# Patient Record
Sex: MALE | Race: WHITE | HISPANIC OR LATINO | URBAN - METROPOLITAN AREA
[De-identification: names, ages, dates, MRNs, and addresses within clinical notes are randomized per-mention and may not be internally consistent; named-entity substitution may affect disease eponyms.]

---

## 2022-01-19 ENCOUNTER — INPATIENT (INPATIENT)
Facility: HOSPITAL | Age: 32
LOS: 1 days | Discharge: HOME | End: 2022-01-21
Attending: INTERNAL MEDICINE | Admitting: INTERNAL MEDICINE
Payer: COMMERCIAL

## 2022-01-19 VITALS
OXYGEN SATURATION: 100 % | DIASTOLIC BLOOD PRESSURE: 87 MMHG | SYSTOLIC BLOOD PRESSURE: 129 MMHG | RESPIRATION RATE: 16 BRPM | HEART RATE: 108 BPM

## 2022-01-19 DIAGNOSIS — E13.10 OTHER SPECIFIED DIABETES MELLITUS WITH KETOACIDOSIS WITHOUT COMA: ICD-10-CM

## 2022-01-19 DIAGNOSIS — E11.9 TYPE 2 DIABETES MELLITUS WITHOUT COMPLICATIONS: ICD-10-CM

## 2022-01-19 DIAGNOSIS — Z29.9 ENCOUNTER FOR PROPHYLACTIC MEASURES, UNSPECIFIED: ICD-10-CM

## 2022-01-19 DIAGNOSIS — R73.9 HYPERGLYCEMIA, UNSPECIFIED: ICD-10-CM

## 2022-01-19 DIAGNOSIS — R74.8 ABNORMAL LEVELS OF OTHER SERUM ENZYMES: ICD-10-CM

## 2022-01-19 LAB
A1C WITH ESTIMATED AVERAGE GLUCOSE RESULT: 10.8 % — HIGH (ref 4–5.6)
A1C WITH ESTIMATED AVERAGE GLUCOSE RESULT: SIGNIFICANT CHANGE UP % (ref 4–5.6)
ALBUMIN SERPL ELPH-MCNC: 4.2 G/DL — SIGNIFICANT CHANGE UP (ref 3.3–5)
ALBUMIN SERPL ELPH-MCNC: 4.4 G/DL — SIGNIFICANT CHANGE UP (ref 3.3–5)
ALP SERPL-CCNC: 220 U/L — HIGH (ref 40–120)
ALP SERPL-CCNC: 237 U/L — HIGH (ref 40–120)
ALT FLD-CCNC: 26 U/L — SIGNIFICANT CHANGE UP (ref 4–41)
ALT FLD-CCNC: 30 U/L — SIGNIFICANT CHANGE UP (ref 4–41)
ANION GAP SERPL CALC-SCNC: 14 MMOL/L — SIGNIFICANT CHANGE UP (ref 7–14)
ANION GAP SERPL CALC-SCNC: 17 MMOL/L — HIGH (ref 7–14)
ANION GAP SERPL CALC-SCNC: 18 MMOL/L — HIGH (ref 7–14)
APPEARANCE UR: ABNORMAL
AST SERPL-CCNC: 19 U/L — SIGNIFICANT CHANGE UP (ref 4–40)
AST SERPL-CCNC: 39 U/L — SIGNIFICANT CHANGE UP (ref 4–40)
B-OH-BUTYR SERPL-SCNC: 1.8 MMOL/L — HIGH (ref 0–0.4)
BASOPHILS # BLD AUTO: 0.04 K/UL — SIGNIFICANT CHANGE UP (ref 0–0.2)
BASOPHILS NFR BLD AUTO: 0.5 % — SIGNIFICANT CHANGE UP (ref 0–2)
BILIRUB SERPL-MCNC: 0.4 MG/DL — SIGNIFICANT CHANGE UP (ref 0.2–1.2)
BILIRUB SERPL-MCNC: 0.5 MG/DL — SIGNIFICANT CHANGE UP (ref 0.2–1.2)
BILIRUB UR-MCNC: NEGATIVE — SIGNIFICANT CHANGE UP
BLOOD GAS VENOUS COMPREHENSIVE RESULT: SIGNIFICANT CHANGE UP
BUN SERPL-MCNC: 10 MG/DL — SIGNIFICANT CHANGE UP (ref 7–23)
BUN SERPL-MCNC: 10 MG/DL — SIGNIFICANT CHANGE UP (ref 7–23)
BUN SERPL-MCNC: 9 MG/DL — SIGNIFICANT CHANGE UP (ref 7–23)
CALCIUM SERPL-MCNC: 9.2 MG/DL — SIGNIFICANT CHANGE UP (ref 8.4–10.5)
CALCIUM SERPL-MCNC: 9.4 MG/DL — SIGNIFICANT CHANGE UP (ref 8.4–10.5)
CALCIUM SERPL-MCNC: 9.4 MG/DL — SIGNIFICANT CHANGE UP (ref 8.4–10.5)
CHLORIDE SERPL-SCNC: 100 MMOL/L — SIGNIFICANT CHANGE UP (ref 98–107)
CHLORIDE SERPL-SCNC: 102 MMOL/L — SIGNIFICANT CHANGE UP (ref 98–107)
CHLORIDE SERPL-SCNC: 97 MMOL/L — LOW (ref 98–107)
CO2 SERPL-SCNC: 19 MMOL/L — LOW (ref 22–31)
CO2 SERPL-SCNC: 22 MMOL/L — SIGNIFICANT CHANGE UP (ref 22–31)
CO2 SERPL-SCNC: 24 MMOL/L — SIGNIFICANT CHANGE UP (ref 22–31)
COLOR SPEC: SIGNIFICANT CHANGE UP
CREAT SERPL-MCNC: 0.69 MG/DL — SIGNIFICANT CHANGE UP (ref 0.5–1.3)
CREAT SERPL-MCNC: 0.79 MG/DL — SIGNIFICANT CHANGE UP (ref 0.5–1.3)
CREAT SERPL-MCNC: 0.87 MG/DL — SIGNIFICANT CHANGE UP (ref 0.5–1.3)
DIFF PNL FLD: NEGATIVE — SIGNIFICANT CHANGE UP
EOSINOPHIL # BLD AUTO: 0.03 K/UL — SIGNIFICANT CHANGE UP (ref 0–0.5)
EOSINOPHIL NFR BLD AUTO: 0.3 % — SIGNIFICANT CHANGE UP (ref 0–6)
ESTIMATED AVERAGE GLUCOSE: 263 — SIGNIFICANT CHANGE UP
FLUAV AG NPH QL: SIGNIFICANT CHANGE UP
FLUBV AG NPH QL: SIGNIFICANT CHANGE UP
GLUCOSE BLDC GLUCOMTR-MCNC: 226 MG/DL — HIGH (ref 70–99)
GLUCOSE BLDC GLUCOMTR-MCNC: 321 MG/DL — HIGH (ref 70–99)
GLUCOSE SERPL-MCNC: 311 MG/DL — HIGH (ref 70–99)
GLUCOSE SERPL-MCNC: 382 MG/DL — HIGH (ref 70–99)
GLUCOSE SERPL-MCNC: 450 MG/DL — CRITICAL HIGH (ref 70–99)
GLUCOSE UR QL: ABNORMAL
GRAN CASTS # UR COMP ASSIST: 2 /LPF — HIGH
HCT VFR BLD CALC: 44.5 % — SIGNIFICANT CHANGE UP (ref 39–50)
HGB BLD-MCNC: 14.8 G/DL — SIGNIFICANT CHANGE UP (ref 13–17)
HYALINE CASTS # UR AUTO: 1 /LPF — SIGNIFICANT CHANGE UP (ref 0–7)
IANC: 5.81 K/UL — SIGNIFICANT CHANGE UP (ref 1.5–8.5)
IMM GRANULOCYTES NFR BLD AUTO: 0.2 % — SIGNIFICANT CHANGE UP (ref 0–1.5)
KETONES UR-MCNC: ABNORMAL
LEUKOCYTE ESTERASE UR-ACNC: NEGATIVE — SIGNIFICANT CHANGE UP
LYMPHOCYTES # BLD AUTO: 1.98 K/UL — SIGNIFICANT CHANGE UP (ref 1–3.3)
LYMPHOCYTES # BLD AUTO: 22.8 % — SIGNIFICANT CHANGE UP (ref 13–44)
MCHC RBC-ENTMCNC: 28.7 PG — SIGNIFICANT CHANGE UP (ref 27–34)
MCHC RBC-ENTMCNC: 33.3 GM/DL — SIGNIFICANT CHANGE UP (ref 32–36)
MCV RBC AUTO: 86.4 FL — SIGNIFICANT CHANGE UP (ref 80–100)
MONOCYTES # BLD AUTO: 0.82 K/UL — SIGNIFICANT CHANGE UP (ref 0–0.9)
MONOCYTES NFR BLD AUTO: 9.4 % — SIGNIFICANT CHANGE UP (ref 2–14)
NEUTROPHILS # BLD AUTO: 5.81 K/UL — SIGNIFICANT CHANGE UP (ref 1.8–7.4)
NEUTROPHILS NFR BLD AUTO: 66.8 % — SIGNIFICANT CHANGE UP (ref 43–77)
NITRITE UR-MCNC: NEGATIVE — SIGNIFICANT CHANGE UP
NRBC # BLD: 0 /100 WBCS — SIGNIFICANT CHANGE UP
NRBC # FLD: 0 K/UL — SIGNIFICANT CHANGE UP
PH UR: 5.5 — SIGNIFICANT CHANGE UP (ref 5–8)
PLATELET # BLD AUTO: 259 K/UL — SIGNIFICANT CHANGE UP (ref 150–400)
POTASSIUM SERPL-MCNC: 4 MMOL/L — SIGNIFICANT CHANGE UP (ref 3.5–5.3)
POTASSIUM SERPL-MCNC: 4.6 MMOL/L — SIGNIFICANT CHANGE UP (ref 3.5–5.3)
POTASSIUM SERPL-MCNC: 5.1 MMOL/L — SIGNIFICANT CHANGE UP (ref 3.5–5.3)
POTASSIUM SERPL-SCNC: 4 MMOL/L — SIGNIFICANT CHANGE UP (ref 3.5–5.3)
POTASSIUM SERPL-SCNC: 4.6 MMOL/L — SIGNIFICANT CHANGE UP (ref 3.5–5.3)
POTASSIUM SERPL-SCNC: 5.1 MMOL/L — SIGNIFICANT CHANGE UP (ref 3.5–5.3)
PROT SERPL-MCNC: 7.1 G/DL — SIGNIFICANT CHANGE UP (ref 6–8.3)
PROT SERPL-MCNC: 8 G/DL — SIGNIFICANT CHANGE UP (ref 6–8.3)
PROT UR-MCNC: ABNORMAL
RBC # BLD: 5.15 M/UL — SIGNIFICANT CHANGE UP (ref 4.2–5.8)
RBC # FLD: 11.9 % — SIGNIFICANT CHANGE UP (ref 10.3–14.5)
RSV RNA NPH QL NAA+NON-PROBE: SIGNIFICANT CHANGE UP
SARS-COV-2 RNA SPEC QL NAA+PROBE: SIGNIFICANT CHANGE UP
SODIUM SERPL-SCNC: 134 MMOL/L — LOW (ref 135–145)
SODIUM SERPL-SCNC: 139 MMOL/L — SIGNIFICANT CHANGE UP (ref 135–145)
SODIUM SERPL-SCNC: 140 MMOL/L — SIGNIFICANT CHANGE UP (ref 135–145)
SP GR SPEC: 1.05 — SIGNIFICANT CHANGE UP (ref 1–1.05)
UROBILINOGEN FLD QL: SIGNIFICANT CHANGE UP
WBC # BLD: 8.7 K/UL — SIGNIFICANT CHANGE UP (ref 3.8–10.5)
WBC # FLD AUTO: 8.7 K/UL — SIGNIFICANT CHANGE UP (ref 3.8–10.5)
WBC UR QL: 1 /HPF — SIGNIFICANT CHANGE UP (ref 0–5)

## 2022-01-19 PROCEDURE — 99223 1ST HOSP IP/OBS HIGH 75: CPT | Mod: GC

## 2022-01-19 PROCEDURE — 99285 EMERGENCY DEPT VISIT HI MDM: CPT

## 2022-01-19 RX ORDER — DEXTROSE 50 % IN WATER 50 %
25 SYRINGE (ML) INTRAVENOUS ONCE
Refills: 0 | Status: DISCONTINUED | OUTPATIENT
Start: 2022-01-19 | End: 2022-01-20

## 2022-01-19 RX ORDER — LANOLIN ALCOHOL/MO/W.PET/CERES
3 CREAM (GRAM) TOPICAL AT BEDTIME
Refills: 0 | Status: DISCONTINUED | OUTPATIENT
Start: 2022-01-19 | End: 2022-01-21

## 2022-01-19 RX ORDER — INSULIN LISPRO 100/ML
VIAL (ML) SUBCUTANEOUS
Refills: 0 | Status: DISCONTINUED | OUTPATIENT
Start: 2022-01-19 | End: 2022-01-20

## 2022-01-19 RX ORDER — INSULIN GLARGINE 100 [IU]/ML
13 INJECTION, SOLUTION SUBCUTANEOUS AT BEDTIME
Refills: 0 | Status: DISCONTINUED | OUTPATIENT
Start: 2022-01-19 | End: 2022-01-20

## 2022-01-19 RX ORDER — SODIUM CHLORIDE 9 MG/ML
2000 INJECTION, SOLUTION INTRAVENOUS ONCE
Refills: 0 | Status: COMPLETED | OUTPATIENT
Start: 2022-01-19 | End: 2022-01-19

## 2022-01-19 RX ORDER — ONDANSETRON 8 MG/1
4 TABLET, FILM COATED ORAL EVERY 8 HOURS
Refills: 0 | Status: DISCONTINUED | OUTPATIENT
Start: 2022-01-19 | End: 2022-01-21

## 2022-01-19 RX ORDER — SODIUM CHLORIDE 9 MG/ML
1000 INJECTION, SOLUTION INTRAVENOUS
Refills: 0 | Status: DISCONTINUED | OUTPATIENT
Start: 2022-01-19 | End: 2022-01-20

## 2022-01-19 RX ORDER — DEXTROSE 50 % IN WATER 50 %
15 SYRINGE (ML) INTRAVENOUS ONCE
Refills: 0 | Status: DISCONTINUED | OUTPATIENT
Start: 2022-01-19 | End: 2022-01-20

## 2022-01-19 RX ORDER — INSULIN LISPRO 100/ML
7 VIAL (ML) SUBCUTANEOUS ONCE
Refills: 0 | Status: COMPLETED | OUTPATIENT
Start: 2022-01-19 | End: 2022-01-19

## 2022-01-19 RX ORDER — DEXTROSE 50 % IN WATER 50 %
12.5 SYRINGE (ML) INTRAVENOUS ONCE
Refills: 0 | Status: DISCONTINUED | OUTPATIENT
Start: 2022-01-19 | End: 2022-01-20

## 2022-01-19 RX ORDER — GLUCAGON INJECTION, SOLUTION 0.5 MG/.1ML
1 INJECTION, SOLUTION SUBCUTANEOUS ONCE
Refills: 0 | Status: DISCONTINUED | OUTPATIENT
Start: 2022-01-19 | End: 2022-01-20

## 2022-01-19 RX ORDER — ACETAMINOPHEN 500 MG
650 TABLET ORAL EVERY 6 HOURS
Refills: 0 | Status: DISCONTINUED | OUTPATIENT
Start: 2022-01-19 | End: 2022-01-21

## 2022-01-19 RX ADMIN — Medication 7 UNIT(S): at 14:09

## 2022-01-19 RX ADMIN — Medication 2: at 19:02

## 2022-01-19 RX ADMIN — INSULIN GLARGINE 13 UNIT(S): 100 INJECTION, SOLUTION SUBCUTANEOUS at 22:36

## 2022-01-19 RX ADMIN — SODIUM CHLORIDE 2000 MILLILITER(S): 9 INJECTION, SOLUTION INTRAVENOUS at 12:35

## 2022-01-19 NOTE — H&P ADULT - ASSESSMENT
31 M no PMHx presents to the hospital after he was referred to the ED by his PMD for elevated glucose. Found to have FS of 403 and elevated beta-hydroxybuterate without acidosis consistent with diabetic ketosis. Patient s/p 2L LR and 7u lispro with improvement in his sugar. Admitted to medicine for further management

## 2022-01-19 NOTE — ED ADULT NURSE NOTE - OBJECTIVE STATEMENT
Pt. is a 31 y.o male who presents to [facility]. Pt. was sent in by PCP for elevated blood sugar. Pt. denies any pmhx. On arrival FS was 411. Pt. endorsing polydipsia, polyuria and polyphagia. Denies any chest pain, N/V/D. States he becomes short of breath easily, but denies hx of asthma. Respirations equal and unlabored, no signs of distress. Normal sinus on cardiac monitor. Comfort measures provided, safety measures implemented, call bell in reach. #20g IV to lt. AC. Labs sent.

## 2022-01-19 NOTE — H&P ADULT - NSHPREVIEWOFSYSTEMS_GEN_ALL_CORE
CONSTITUTIONAL: No fever, no chills, no weight loss  EYES: No eye pain, no visual disturbance  RESPIRATORY: No cough, No SOB  CARDIOVASCULAR: No CP, no palpitations  GASTROINTESTINAL: no abdominal pain, no n/v/d  GENITOURINARY: No dysuria, no hematuria  HEME: No easy bruising, no bleeding gums  NEURO: No headaches, no weakness  SKIN: No itching, no rashes  MSK: No joint pain, no joint swelling CONSTITUTIONAL: No fever, no chills, + weight loss  EYES: No eye pain, +visual disturbance  RESPIRATORY: No cough, No SOB  CARDIOVASCULAR: No CP, no palpitations  GASTROINTESTINAL: no abdominal pain, no n/v/d  GENITOURINARY: No dysuria, no hematuria, +increase in urination  HEME: No easy bruising, no bleeding gums  NEURO: No headaches, + weakness  SKIN: No itching, no rashes  MSK: No joint pain, no joint swelling Review of Systems:   CONSTITUTIONAL: No fever, no chills, + weight loss  EYES: No eye pain, +visual disturbance  ENMT:  No difficulty hearing, tinnitus, vertigo; No sinus or throat pain  NECK: No pain or stiffness  BREASTS: No pain, masses, or nipple discharge  RESPIRATORY: No cough, wheezing, chills or hemoptysis; No shortness of breath  CARDIOVASCULAR: No chest pain, palpitations, dizziness, or leg swelling  GASTROINTESTINAL: No abdominal or epigastric pain. No nausea, vomiting, or hematemesis; No diarrhea or constipation. No melena or hematochezia.  GENITOURINARY: No dysuria, frequency, hematuria, or incontinence  NEUROLOGICAL: No headaches, memory loss, loss of strength, numbness, or tremors  SKIN: No itching, burning, rashes, or lesions   LYMPH NODES: No enlarged glands  ENDOCRINE: No heat or cold intolerance; No hair loss  MUSCULOSKELETAL: No joint pain or swelling; No muscle, back, or extremity pain  PSYCHIATRIC: No depression, anxiety, mood swings, or difficulty sleeping  HEME/LYMPH: No easy bruising, or bleeding gums  ALLERGY AND IMMUNOLOGIC: No hives or eczema

## 2022-01-19 NOTE — H&P ADULT - PROBLEM SELECTOR PLAN 2
Patient with A1c 10.8 i/s/o no prior DM2 diagnosis  - Treatment of DM as above  - Endo consult in AM, will likely need close endocrine and PMD follow-up outpatient  - Send microalbumin/Cr ratio  - Will send Lipid profile in AM Patient with A1c 10.8 i/s/o no prior DM2 diagnosis  - Treatment of DM as above  - Endo consult in AM, will need close endocrine and PMD follow-up outpatient  - Send microalbumin/Cr ratio  - Will send Lipid profile in AM Patient with A1c 10.8 i/s/o no prior DM2 diagnosis  - Treatment of DM as above  - Endo consult in AM, will need close endocrine and PMD follow-up outpatient  - Send microalbumin/Cr ratio  - f/u Lipid profile in AM  - f/u Islet cell antibody, glutamic acid, and zinc transporter

## 2022-01-19 NOTE — ED ADULT NURSE NOTE - CHPI ED NUR SYMPTOMS NEG
no back pain/no chills/no decreased eating/drinking/no dizziness/no fever/no headache/no nausea/no pain/no vomiting

## 2022-01-19 NOTE — H&P ADULT - NSHPSOCIALHISTORY_GEN_ALL_CORE
Living:  Tobacco:  Alcohol:  Drugs: Living: Lives with his family. Works as [employer]  as above  Tobacco: Denies  Alcohol: Has a history of extensive alcohol use in the past. Now reports drinking 17oz of liquor which he chases with beer once a week  Drugs: denies

## 2022-01-19 NOTE — H&P ADULT - PROBLEM SELECTOR PLAN 1
Patient with FG in 400s and elevated beta-hydroxybuterate to 1.8 without acidosis  - s/p 7u of insulin and 2L LR with improving glucose  - Will start patient on weight-based basal and ISS  - C/w Fluids Patient with FG in 400s and elevated beta-hydroxybuterate to 1.8 without acidosis  - s/p 7u of insulin and 2L LR with improving glucose  - Will start patient on weight-based basal and ISS  - C/w 75 cc LR for now Patient with FG in 400s and elevated beta-hydroxybuterate to 1.8 without acidosis. Unclear etiology of exacerbation. Patient without any signs of infection. UA negative  - s/p 7u of insulin and 2L LR with improving glucose  - Will give basal insulin 14u right now with pre-meal 4u  - C/w 75 cc LR for now  - Endo consult in the AM Patient with FG in 400s and elevated beta-hydroxybuterate to 1.8 without acidosis. Unclear etiology of exacerbation. Patient without any signs of infection. UA negative  - s/p 7u of insulin and 2L LR with improving glucose  - Will give basal insulin 13u with pre-meal 4u  - C/w 75 cc LR for now  - Endo consult in the AM

## 2022-01-19 NOTE — ED PROVIDER NOTE - CLINICAL SUMMARY MEDICAL DECISION MAKING FREE TEXT BOX
31M presents with hyperglycemia. R/o DKA. Check HBA1c. May require CDU/admission based on labs/need for insulin.

## 2022-01-19 NOTE — H&P ADULT - PROBLEM SELECTOR PLAN 3
Patient with elevated alk phos to 237, unclear etiology. Denies any abdominal pain. Some reported elevation in alkphos in patient with diabetes. Differential diagnosis to consider hematochromatosis.  - Will send serum iron, ferritin, TIBC, transferrin in AM  - CTM at this time Patient with elevated alk phos to 237, unclear etiology. Denies any abdominal pain. Some reported elevation in alkphos in patient with diabetes. Differential diagnosis to consider hematochromatosis.  - Will send gtt in the AM  - CTM at this time Patient with elevated alk phos to 237, unclear etiology. Denies any abdominal pain. Some reported elevation in alkphos in patient with diabetes. Differential diagnosis to consider hematochromatosis.  - F/u gtt in the AM  - CTM at this time

## 2022-01-19 NOTE — H&P ADULT - ATTENDING COMMENTS
31 M no PMHx presents to the hospital in mild DKA. No clear evidence of infection or MI. Patient with undiagnosed diabetes mellitus, unclear type at this time. Starting glargine. A1c >10%; will c/s endocrine to assist w/management.     I examined this patient and discussed their management with house staff on Jan 19, 2022.

## 2022-01-19 NOTE — ED PROVIDER NOTE - ATTENDING CONTRIBUTION TO CARE
31M w no reported medical history p/w hyperglycemia on outpatient labs. States he has had frequent urination, thirst, and fatigue x 3 weeks. Denies n/v, abdominal pain, chest pain, sob.     Except as documented in the HPI,  all other systems are negative    CONSTITUTIONAL: NAD, awake, alert  HEAD: Normocephalic; atraumatic  ENMT: External appears normal, MMM  NECK: no tenderness, FROM  CARD: Normal Sl, S2; no audible murmurs,rubs  RESP: normal wob, lungs ctab  ABD: soft, non-distended; non-tender  MSK: no edema, normal ROM in all four extremities  SKIN: Warm, dry, no rashes  NEURO: aaox3, moving all extremities spontaneously    31M w no reported medical history p/w hyperglycemia, r/o dka, reassess, abdomen soft nontender, neuro intact, no n/v, no infectious symptoms

## 2022-01-19 NOTE — H&P ADULT - NSHPPHYSICALEXAM_GEN_ALL_CORE
.  VITAL SIGNS:  T(C): 37.1 (01-19-22 @ 12:35), Max: 37.1 (01-19-22 @ 12:35)  T(F): 98.8 (01-19-22 @ 12:35), Max: 98.8 (01-19-22 @ 12:35)  HR: 89 (01-19-22 @ 12:35) (89 - 108)  BP: 135/78 (01-19-22 @ 12:35) (129/87 - 135/78)  BP(mean): --  RR: 18 (01-19-22 @ 12:35) (16 - 18)  SpO2: 100% (01-19-22 @ 12:35) (100% - 100%)    PHYSICAL EXAM:    Constitutional: WDWN resting comfortably in bed; NAD  Head: NC/AT  Eyes: PERRL, EOMI, anicteric sclera  ENT: dry mucous membranes  Neck: supple; no JVD  Respiratory: CTA B/L; no W/R/R, no retractions  Cardiac: +S1/S2; RRR; no M/R/G; PMI non-displaced  Gastrointestinal: soft, NT/ND; no rebound or guarding; +BSx4  Back: spine midline, no bony tenderness or step-offs; no CVAT B/L  Extremities: WWP, no clubbing or cyanosis; no peripheral edema. Capillary refill <2 sec  Musculoskeletal: NROM x4; no joint swelling, tenderness or erythema  Vascular: 2+ radial, femoral, DP/PT pulses B/L  Dermatologic: skin warm, dry and intact; no rashes, wounds, or scars  Lymphatic: no submandibular or cervical LAD  Neurologic: AAOx3; CNII-XII grossly intact; no focal deficits  Psychiatric: affect and characteristics of appearance, verbalizations, behaviors are appropriate VITAL SIGNS:  T(C): 37.1 (01-19-22 @ 12:35), Max: 37.1 (01-19-22 @ 12:35)  T(F): 98.8 (01-19-22 @ 12:35), Max: 98.8 (01-19-22 @ 12:35)  HR: 89 (01-19-22 @ 12:35) (89 - 108)  BP: 135/78 (01-19-22 @ 12:35) (129/87 - 135/78)  BP(mean): --  RR: 18 (01-19-22 @ 12:35) (16 - 18)  SpO2: 100% (01-19-22 @ 12:35) (100% - 100%)    PHYSICAL EXAM:    Constitutional: WDWN resting comfortably in bed; NAD  Head: NC/AT  Eyes: PERRL, EOMI, anicteric sclera  ENT: MMM  Neck: supple; no JVD  Respiratory: CTA B/L; no W/R/R, no retractions  Cardiac: +S1/S2; RRR; no M/R/G; PMI non-displaced  Gastrointestinal: soft, NT/ND; no rebound or guarding; +BSx4  Back: spine midline, no bony tenderness or step-offs; no CVAT B/L  Extremities: WWP, no clubbing or cyanosis; no peripheral edema. Capillary refill <2 sec  Musculoskeletal: NROM x4; no joint swelling, tenderness or erythema  Vascular: 2+ radial, femoral, DP/PT pulses B/L  Dermatologic: skin warm, dry and intact; no rashes, wounds, or scars  Lymphatic: no submandibular or cervical LAD  Neurologic: AAOx3; CNII-XII grossly intact; no focal deficits  Psychiatric: affect and characteristics of appearance, verbalizations, behaviors are appropriate

## 2022-01-19 NOTE — ED PROVIDER NOTE - PHYSICAL EXAMINATION
General: Well developed, well nourished  HEENT: Normocephalic and atraumatic, EOMI, Trachea midline.   Cardiac: Normal S1 and S2 w/ RRR. No MRG.  Pulmonary: CTA bilaterally. No increased WOB.   Abdominal: Soft, NTND  Neurologic: No focal sensory or motor deficits.  Musculoskeletal: No limited ROM or deformities  Vascular: Warm and well perfused  Skin: Color appropriate   Psychiatric: Appropriate mood and affect. No apparent risk to self or others.  Bg Bonner M.D.

## 2022-01-19 NOTE — ED ADULT NURSE NOTE - NSIMPLEMENTINTERV_GEN_ALL_ED
Implemented All Universal Safety Interventions:  [place] to call system. Call bell, personal items and telephone within reach. Instruct patient to call for assistance. Room bathroom lighting operational. Non-slip footwear when patient is off stretcher. Physically safe environment: no spills, clutter or unnecessary equipment. Stretcher in lowest position, wheels locked, appropriate side rails in place.

## 2022-01-19 NOTE — ED ADULT TRIAGE NOTE - CHIEF COMPLAINT QUOTE
Pt sent by PCP with high BP , high , increased thirst, increase urination., weakness.    Denies chest pain, sob, dizziness, cough, chills.  Pt not dx with DM

## 2022-01-19 NOTE — H&P ADULT - NSHPLABSRESULTS_GEN_ALL_CORE
.  LABS:                         14.8   8.70  )-----------( 259      ( 2022 14:36 )             44.5         140  |  102  |  9   ----------------------------<  311<H>  4.0   |  24  |  0.79    Ca    9.2      2022 16:24    TPro  7.1  /  Alb  4.2  /  TBili  0.4  /  DBili  x   /  AST  19  /  ALT  26  /  AlkPhos  220<H>        Urinalysis Basic - ( 2022 13:06 )    Color: Light Yellow / Appearance: Slightly Turbid / S.046 / pH: x  Gluc: x / Ketone: Moderate  / Bili: Negative / Urobili: <2 mg/dL   Blood: x / Protein: Trace / Nitrite: Negative   Leuk Esterase: Negative / RBC: x / WBC 1 /HPF   Sq Epi: x / Non Sq Epi: x / Bacteria: x                RADIOLOGY, EKG & ADDITIONAL TESTS: Reviewed.

## 2022-01-19 NOTE — H&P ADULT - HISTORY OF PRESENT ILLNESS
*INCOMPLETE*    31 M no PMHx presents to the hospital after he was found to have elevated FS to 600 with his outpatient PMD. Patient reports that for the past 3 weeks he has been having fatigue, frequent urination, and thirst. He denies any HA, blurry vision, recent fevers, chills, night sweats, weight loss, SOB, CP, abdominal pain, n/v/d, constipation, or any other symptoms. Patient has a family history of DM in ??    Patient reports that his diet mainly consisted of ??. Exercise ??    Patient is/is not COVID vaccinated 31 M no PMHx presents to the hospital after he was found to have elevated FS to 600 with his outpatient PMD. Patient reports that for the past 3 weeks he has been having fatigue, blurry vision, frequent urination, and thirst. Patient works as a [employer]  at night. He works from 3pm to 1am. He usually skips breakfast and lunch and eats fast food for dinner, which he picks up on his way home. His diet has been like this for past few years. Patient does not exercise due to his busy schedule. He reports that for the 3 weeks, his thirst has been really severe and he has been drinking about a gallon of [place] before bedtime. Today, he also experienced some shakiness when he woke up, which was new for him. Endorses 30lbs weight loss since he started noticing increase in urination. He denies any HA, recent fevers, chills, night sweats, SOB, CP, abdominal pain, n/v/d, constipation, or any other symptoms. \    Patient has a extensive family history of DM on maternal and paternal sides, including parents, uncles and cousins with DM. His father also has HTN and CAD with stent placement.    In the ED, VSS. FS found to be in 400 with ketosis without acidosis. Patient s/p 2L LR and 7u lispro. Patient admitted to medicine for further management 31M no PMHx presents to the hospital after he was found to have elevated FS to 600 with his outpatient PMD. Patient reports that for the past 3 weeks he has been having fatigue, blurry vision, frequent urination, and thirst. Patient works as a [employer]  at night. He works from 3pm to 1am. He usually skips breakfast and lunch and eats fast food for dinner, which he picks up on his way home. His diet has been like this for past few years. Patient does not exercise due to his busy schedule. He reports that for the 3 weeks, his thirst has been really severe and he has been drinking about a gallon of [place] before bedtime. Today, he also experienced some shakiness when he woke up, which was new for him. Endorses 30lbs weight loss since he started noticing increase in urination. He denies any HA, recent fevers, chills, night sweats, SOB, CP, abdominal pain, n/v/d, constipation, or any other symptoms. \    Patient has a extensive family history of DM on maternal and paternal sides, including parents, uncles and cousins with DM. His father also has HTN and CAD with stent placement.    In the ED, VSS. FS found to be in 400 with ketosis without acidosis. Patient s/p 2L LR and 7u lispro. Patient admitted to medicine for further management

## 2022-01-19 NOTE — ED PROVIDER NOTE - OBJECTIVE STATEMENT
31M no sig PMH presents with hyperglycemia. Pt states for last 3 weeks has been having fatigue, frequent urination, thirst. Went to PMD and had BGL which showed finger stick elevated to 600 and referred to ER. States no f/c, no CP/SOB, n/v/d. Has fam hx of DM.

## 2022-01-19 NOTE — ED ADULT NURSE REASSESSMENT NOTE - NS ED NURSE REASSESS COMMENT FT1
FLOATRN. Patient is a&ox4 ambulatory at baseline. New iv placed, 2L bolus placed. Patient is resting awaiting for results

## 2022-01-20 DIAGNOSIS — E11.9 TYPE 2 DIABETES MELLITUS WITHOUT COMPLICATIONS: ICD-10-CM

## 2022-01-20 DIAGNOSIS — I10 ESSENTIAL (PRIMARY) HYPERTENSION: ICD-10-CM

## 2022-01-20 DIAGNOSIS — E78.5 HYPERLIPIDEMIA, UNSPECIFIED: ICD-10-CM

## 2022-01-20 LAB
ALBUMIN SERPL ELPH-MCNC: 3.8 G/DL — SIGNIFICANT CHANGE UP (ref 3.3–5)
ALP SERPL-CCNC: 182 U/L — HIGH (ref 40–120)
ALT FLD-CCNC: 23 U/L — SIGNIFICANT CHANGE UP (ref 4–41)
ANION GAP SERPL CALC-SCNC: 12 MMOL/L — SIGNIFICANT CHANGE UP (ref 7–14)
AST SERPL-CCNC: 18 U/L — SIGNIFICANT CHANGE UP (ref 4–40)
BASOPHILS # BLD AUTO: 0.05 K/UL — SIGNIFICANT CHANGE UP (ref 0–0.2)
BASOPHILS NFR BLD AUTO: 0.5 % — SIGNIFICANT CHANGE UP (ref 0–2)
BILIRUB SERPL-MCNC: 0.6 MG/DL — SIGNIFICANT CHANGE UP (ref 0.2–1.2)
BUN SERPL-MCNC: 10 MG/DL — SIGNIFICANT CHANGE UP (ref 7–23)
CALCIUM SERPL-MCNC: 8.7 MG/DL — SIGNIFICANT CHANGE UP (ref 8.4–10.5)
CHLORIDE SERPL-SCNC: 101 MMOL/L — SIGNIFICANT CHANGE UP (ref 98–107)
CHOLEST SERPL-MCNC: 180 MG/DL — SIGNIFICANT CHANGE UP
CO2 SERPL-SCNC: 20 MMOL/L — LOW (ref 22–31)
CREAT SERPL-MCNC: 0.76 MG/DL — SIGNIFICANT CHANGE UP (ref 0.5–1.3)
EOSINOPHIL # BLD AUTO: 0.08 K/UL — SIGNIFICANT CHANGE UP (ref 0–0.5)
EOSINOPHIL NFR BLD AUTO: 0.8 % — SIGNIFICANT CHANGE UP (ref 0–6)
GGT SERPL-CCNC: 46 U/L — SIGNIFICANT CHANGE UP (ref 8–61)
GLUCOSE BLDC GLUCOMTR-MCNC: 208 MG/DL — HIGH (ref 70–99)
GLUCOSE BLDC GLUCOMTR-MCNC: 245 MG/DL — HIGH (ref 70–99)
GLUCOSE BLDC GLUCOMTR-MCNC: 248 MG/DL — HIGH (ref 70–99)
GLUCOSE BLDC GLUCOMTR-MCNC: 288 MG/DL — HIGH (ref 70–99)
GLUCOSE BLDC GLUCOMTR-MCNC: 299 MG/DL — HIGH (ref 70–99)
GLUCOSE SERPL-MCNC: 319 MG/DL — HIGH (ref 70–99)
HCT VFR BLD CALC: 43.7 % — SIGNIFICANT CHANGE UP (ref 39–50)
HDLC SERPL-MCNC: 41 MG/DL — SIGNIFICANT CHANGE UP
HGB BLD-MCNC: 14.1 G/DL — SIGNIFICANT CHANGE UP (ref 13–17)
IANC: 5.59 K/UL — SIGNIFICANT CHANGE UP (ref 1.5–8.5)
IMM GRANULOCYTES NFR BLD AUTO: 0.3 % — SIGNIFICANT CHANGE UP (ref 0–1.5)
LIPID PNL WITH DIRECT LDL SERPL: 123 MG/DL — HIGH
LYMPHOCYTES # BLD AUTO: 3.08 K/UL — SIGNIFICANT CHANGE UP (ref 1–3.3)
LYMPHOCYTES # BLD AUTO: 32.1 % — SIGNIFICANT CHANGE UP (ref 13–44)
MAGNESIUM SERPL-MCNC: 1.9 MG/DL — SIGNIFICANT CHANGE UP (ref 1.6–2.6)
MCHC RBC-ENTMCNC: 28.5 PG — SIGNIFICANT CHANGE UP (ref 27–34)
MCHC RBC-ENTMCNC: 32.3 GM/DL — SIGNIFICANT CHANGE UP (ref 32–36)
MCV RBC AUTO: 88.3 FL — SIGNIFICANT CHANGE UP (ref 80–100)
MONOCYTES # BLD AUTO: 0.77 K/UL — SIGNIFICANT CHANGE UP (ref 0–0.9)
MONOCYTES NFR BLD AUTO: 8 % — SIGNIFICANT CHANGE UP (ref 2–14)
NEUTROPHILS # BLD AUTO: 5.59 K/UL — SIGNIFICANT CHANGE UP (ref 1.8–7.4)
NEUTROPHILS NFR BLD AUTO: 58.3 % — SIGNIFICANT CHANGE UP (ref 43–77)
NON HDL CHOLESTEROL: 139 MG/DL — HIGH
NRBC # BLD: 0 /100 WBCS — SIGNIFICANT CHANGE UP
NRBC # FLD: 0 K/UL — SIGNIFICANT CHANGE UP
PHOSPHATE SERPL-MCNC: 3.4 MG/DL — SIGNIFICANT CHANGE UP (ref 2.5–4.5)
PLATELET # BLD AUTO: 239 K/UL — SIGNIFICANT CHANGE UP (ref 150–400)
POTASSIUM SERPL-MCNC: 4.1 MMOL/L — SIGNIFICANT CHANGE UP (ref 3.5–5.3)
POTASSIUM SERPL-SCNC: 4.1 MMOL/L — SIGNIFICANT CHANGE UP (ref 3.5–5.3)
PROT SERPL-MCNC: 6.7 G/DL — SIGNIFICANT CHANGE UP (ref 6–8.3)
RBC # BLD: 4.95 M/UL — SIGNIFICANT CHANGE UP (ref 4.2–5.8)
RBC # FLD: 11.9 % — SIGNIFICANT CHANGE UP (ref 10.3–14.5)
SODIUM SERPL-SCNC: 133 MMOL/L — LOW (ref 135–145)
TRIGL SERPL-MCNC: 79 MG/DL — SIGNIFICANT CHANGE UP
WBC # BLD: 9.6 K/UL — SIGNIFICANT CHANGE UP (ref 3.8–10.5)
WBC # FLD AUTO: 9.6 K/UL — SIGNIFICANT CHANGE UP (ref 3.8–10.5)

## 2022-01-20 PROCEDURE — 99233 SBSQ HOSP IP/OBS HIGH 50: CPT | Mod: GC

## 2022-01-20 PROCEDURE — 99223 1ST HOSP IP/OBS HIGH 75: CPT

## 2022-01-20 RX ORDER — DEXTROSE 50 % IN WATER 50 %
12.5 SYRINGE (ML) INTRAVENOUS ONCE
Refills: 0 | Status: DISCONTINUED | OUTPATIENT
Start: 2022-01-20 | End: 2022-01-21

## 2022-01-20 RX ORDER — SODIUM CHLORIDE 9 MG/ML
1000 INJECTION, SOLUTION INTRAVENOUS
Refills: 0 | Status: DISCONTINUED | OUTPATIENT
Start: 2022-01-20 | End: 2022-01-21

## 2022-01-20 RX ORDER — INSULIN LISPRO 100/ML
VIAL (ML) SUBCUTANEOUS AT BEDTIME
Refills: 0 | Status: DISCONTINUED | OUTPATIENT
Start: 2022-01-20 | End: 2022-01-20

## 2022-01-20 RX ORDER — DEXTROSE 50 % IN WATER 50 %
25 SYRINGE (ML) INTRAVENOUS ONCE
Refills: 0 | Status: DISCONTINUED | OUTPATIENT
Start: 2022-01-20 | End: 2022-01-21

## 2022-01-20 RX ORDER — INSULIN LISPRO 100/ML
VIAL (ML) SUBCUTANEOUS AT BEDTIME
Refills: 0 | Status: DISCONTINUED | OUTPATIENT
Start: 2022-01-20 | End: 2022-01-21

## 2022-01-20 RX ORDER — INSULIN LISPRO 100/ML
VIAL (ML) SUBCUTANEOUS
Refills: 0 | Status: DISCONTINUED | OUTPATIENT
Start: 2022-01-20 | End: 2022-01-21

## 2022-01-20 RX ORDER — INSULIN LISPRO 100/ML
4 VIAL (ML) SUBCUTANEOUS
Refills: 0 | Status: DISCONTINUED | OUTPATIENT
Start: 2022-01-20 | End: 2022-01-20

## 2022-01-20 RX ORDER — INSULIN GLARGINE 100 [IU]/ML
25 INJECTION, SOLUTION SUBCUTANEOUS AT BEDTIME
Refills: 0 | Status: DISCONTINUED | OUTPATIENT
Start: 2022-01-20 | End: 2022-01-21

## 2022-01-20 RX ORDER — INSULIN LISPRO 100/ML
VIAL (ML) SUBCUTANEOUS
Refills: 0 | Status: DISCONTINUED | OUTPATIENT
Start: 2022-01-20 | End: 2022-01-20

## 2022-01-20 RX ORDER — GLUCAGON INJECTION, SOLUTION 0.5 MG/.1ML
1 INJECTION, SOLUTION SUBCUTANEOUS ONCE
Refills: 0 | Status: DISCONTINUED | OUTPATIENT
Start: 2022-01-20 | End: 2022-01-21

## 2022-01-20 RX ORDER — INSULIN LISPRO 100/ML
9 VIAL (ML) SUBCUTANEOUS
Refills: 0 | Status: DISCONTINUED | OUTPATIENT
Start: 2022-01-20 | End: 2022-01-21

## 2022-01-20 RX ORDER — DEXTROSE 50 % IN WATER 50 %
15 SYRINGE (ML) INTRAVENOUS ONCE
Refills: 0 | Status: DISCONTINUED | OUTPATIENT
Start: 2022-01-20 | End: 2022-01-21

## 2022-01-20 RX ORDER — INSULIN GLARGINE 100 [IU]/ML
13 INJECTION, SOLUTION SUBCUTANEOUS AT BEDTIME
Refills: 0 | Status: DISCONTINUED | OUTPATIENT
Start: 2022-01-20 | End: 2022-01-20

## 2022-01-20 RX ADMIN — Medication 4: at 18:13

## 2022-01-20 RX ADMIN — Medication 9 UNIT(S): at 18:19

## 2022-01-20 RX ADMIN — INSULIN GLARGINE 25 UNIT(S): 100 INJECTION, SOLUTION SUBCUTANEOUS at 22:02

## 2022-01-20 RX ADMIN — SODIUM CHLORIDE 75 MILLILITER(S): 9 INJECTION, SOLUTION INTRAVENOUS at 10:30

## 2022-01-20 RX ADMIN — Medication 2: at 13:06

## 2022-01-20 RX ADMIN — Medication 3: at 09:24

## 2022-01-20 NOTE — PROGRESS NOTE ADULT - PROBLEM SELECTOR PLAN 2
Patient with A1c 10.8 i/s/o no prior DM2 diagnosis  - Treatment of DM as above  - Endo consult in AM, will need close endocrine and PMD follow-up outpatient  - Send microalbumin/Cr ratio  - Will send Lipid profile in AM Patient with A1c 10.8 i/s/o no prior DM2 diagnosis  - Treatment of DM as above  - Endo consult in AM, will need close endocrine and PMD follow-up outpatient  - Send microalbumin/Cr ratio  - f/u Lipid profile in AM  - f/u Islet cell antibody, glutamic acid, and zinc transporter. Patient with A1c 10.8 i/s/o no prior DM2 diagnosis  - Treatment of DM as above  - Endo consult in AM, will need close endocrine and PMD follow-up outpatient  - Send protein/Cr ratio  - f/u Lipid profile in AM  - f/u Islet cell antibody, glutamic acid, and zinc transporter.

## 2022-01-20 NOTE — CONSULT NOTE ADULT - ASSESSMENT
31M no PMHx except acid reflux presents to the hospital after he was found to have elevated FS to 600 with his outpatient PMD. Endocrine consulted for new DM.    Newly diagnosed T2DM with DKA  A1c 10.8%  -Increase Lantus to  units at bedtime. DO NOT HOLD IF NPO.  -Increase Admelog to  units TID pre-meal. HOLD IF NPO.  -Start moderate Admelog correction scale pre-meal  -Start moderate Admelog correction scale at bedtime  -Fingerstick BG before meals and bedtime  -Goal -180  -Carbohydrate consistent diet  -RD consult  Discharge plan:  -Likely to discharge patient home on basal insulin plus metformin. Start with 500mg metformin BID. If tolerates, increase to 1g BID after 1 week. Final regimen pending clinical course.  -Patient will need education on how to self-administer insulin, how to check FSBG, as well as hypoglycemia management. Patient to call doctor with persistent high or low BG at home.   -Will need to have glucometer, test strips and lancets sent to pharmacy prior to discharge.  -Recommend routine outpatient ophthalmology and endocrinology f/u.     HTN  -Outpatient goal BP <130/80. Management per primary team.    HLD  -Lipids reviewed    Trenton Alvarado DO, Endocrinology Fellow  Pager [phone] from 9am to 5pm. After hours and on weekends, please call [phone]. 31M no PMHx except acid reflux presents to the hospital after he was found to have elevated FS to 600 with his outpatient PMD. Endocrine consulted for new DM.    Newly diagnosed T2DM with DKA  A1c 10.8%  -Increase Lantus to 25 units at bedtime. DO NOT HOLD IF NPO.  -Increase Admelog to 9 units TID pre-meal. HOLD IF NPO.  -Start moderate Admelog correction scale pre-meal  -Start moderate Admelog correction scale at bedtime  -Fingerstick BG before meals and bedtime  -Goal -180  -Carbohydrate consistent diet  -RD consult  Discharge plan:  -Likely to discharge patient home on basal insulin plus metformin. Start with 500mg metformin BID. If tolerates, increase to 1g BID after 1 week. Please send Lantus solostar pen and Humalog Kwikpen as test scripts to check insurance coverage.  Okay to send with current doses and update prior to d/c. "Lantus Solostar Pen 25 units before bedtime, dispense 15mL." If not covered, can try alternating with one of following: tresiba/basaglar/toujeo/Levemir. Final regimen pending clinical course.  -Patient will need education on how to self-administer insulin, how to check FSBG, as well as hypoglycemia management. Patient to call doctor with persistent high or low BG at home.   -Will need to have glucometer (ACCU_CHECK Briseyda Connect, Ascensia Contour Next EZ or One, Freestyle [place] LITE or OneTouch Verio IQ), test strips and lancets (make sure compatible with glucometer, dispense #100 (or #200) and use as directed) sent to pharmacy prior to discharge.  -Will also need BD shirley 4mm pen needles  -Recommend routine outpatient ophthalmology and endocrinology f/u. Patient can f/u outpatient with Endocrinology faculty practice [facility]: Endocrinology at [place].   [address] [place] [address], [address]  [place] [address]  Phone: [phone]  Fax: [phone]    HTN  -Outpatient goal BP <130/80. Management per primary team.    HLD  -Lipids reviewed    Trenton Alvarado DO, Endocrinology Fellow  Pager [phone] from 9am to 5pm. After hours and on weekends, please call [phone].

## 2022-01-20 NOTE — PATIENT PROFILE ADULT - FALL HARM RISK - UNIVERSAL INTERVENTIONS
Bed in lowest position, wheels locked, appropriate side rails in place/Call bell, personal items and telephone in reach/Instruct patient to call for assistance before getting out of bed or chair/Non-slip footwear when patient is out of bed/[place] to call system/Physically safe environment - no spills, clutter or unnecessary equipment/Purposeful Proactive Rounding/Room/bathroom lighting operational, light cord in reach

## 2022-01-20 NOTE — PROGRESS NOTE ADULT - PROBLEM SELECTOR PLAN 3
Patient with elevated alk phos to 237, unclear etiology. Denies any abdominal pain. Some reported elevation in alkphos in patient with diabetes. Differential diagnosis to consider hematochromatosis.  - Will send gtt in the AM  - CTM at this time

## 2022-01-20 NOTE — CONSULT NOTE ADULT - SUBJECTIVE AND OBJECTIVE BOX
HPI:  31M no PMHx presents to the hospital after he was found to have elevated FS to 600 with his outpatient PMD. Endocrine consulted for new DM.    Initially BG in the 400s with milk DKA but this has since resolved. Was started on a carb consistent diet mid-day today and is receiving basal/bolus insulin.    Last A1c: 10.8%.       PAST MEDICAL & SURGICAL HISTORY:      FAMILY HISTORY:      Social History:    Outpatient Medications: Home Medications:      MEDICATIONS  (STANDING):  dextrose 40% Gel 15 Gram(s) Oral once  dextrose 5%. 1000 milliLiter(s) (50 mL/Hr) IV Continuous <Continuous>  dextrose 5%. 1000 milliLiter(s) (100 mL/Hr) IV Continuous <Continuous>  dextrose 50% Injectable 25 Gram(s) IV Push once  dextrose 50% Injectable 12.5 Gram(s) IV Push once  dextrose 50% Injectable 25 Gram(s) IV Push once  glucagon  Injectable 1 milliGRAM(s) IntraMuscular once  insulin glargine Injectable (LANTUS) 13 Unit(s) SubCutaneous at bedtime  insulin lispro (ADMELOG) corrective regimen sliding scale   SubCutaneous three times a day before meals  insulin lispro (ADMELOG) corrective regimen sliding scale   SubCutaneous at bedtime  insulin lispro Injectable (ADMELOG) 4 Unit(s) SubCutaneous three times a day before meals  lactated ringers. 1000 milliLiter(s) (75 mL/Hr) IV Continuous <Continuous>    MEDICATIONS  (PRN):  acetaminophen     Tablet .. 650 milliGRAM(s) Oral every 6 hours PRN Temp greater or equal to 38C (100.4F), Mild Pain (1 - 3)  aluminum hydroxide/magnesium hydroxide/simethicone Suspension 30 milliLiter(s) Oral every 4 hours PRN Dyspepsia  melatonin 3 milliGRAM(s) Oral at bedtime PRN Insomnia  ondansetron Injectable 4 milliGRAM(s) IV Push every 8 hours PRN Nausea and/or Vomiting      Allergies    No Known Allergies    Intolerances      Review of Systems:  Constitutional: No fever, chills   Neuro: No tremors, headache   Cardiovascular: No chest pain, palpitations  Respiratory: No SOB, no cough  GI: No nausea, vomiting, abdominal pain  : No dysuria, polyuria   Skin: no rash, ulcers   Psych: no depression, anxiety   Endocrine: no polyphagia, polydipsia     ALL OTHER SYSTEMS REVIEWED AND NEGATIVE        PHYSICAL EXAM:  VITALS: T(C): 36.5 (01-20-22 @ 12:37)  T(F): 97.7 (01-20-22 @ 12:37), Max: 98.8 (01-19-22 @ 22:20)  HR: 87 (01-20-22 @ 12:37) (87 - 90)  BP: 125/85 (01-20-22 @ 12:37) (125/85 - 142/93)  RR:  (17 - 20)  SpO2:  (95% - 100%)  Wt(kg): --  GENERAL: NAD, well-groomed, well-developed  EYES: No proptosis, anicteric  HEENT:  Atraumatic, Normocephalic, moist mucous membranes  THYROID: Normal size, no palpable nodules  RESPIRATORY: Clear to auscultation bilaterally; No rales, rhonchi, wheezing  CARDIOVASCULAR: Regular rate and rhythm; No murmurs  GI: Soft, nontender, non distended, normal bowel sounds  SKIN: Dry, intact, No rashes or lesions  NEURO: AOx3, moves all extremities spontaneously   PSYCH: Reactive affect, euthymic mood    POCT Blood Glucose.: 248 mg/dL (01-20-22 @ 12:31)  POCT Blood Glucose.: 288 mg/dL (01-20-22 @ 08:37)  POCT Blood Glucose.: 299 mg/dL (01-20-22 @ 03:41)  POCT Blood Glucose.: 321 mg/dL (01-19-22 @ 22:09)  POCT Blood Glucose.: 226 mg/dL (01-19-22 @ 18:27)  POCT Blood Glucose.: 411 mg/dL (01-19-22 @ 11:16)  POCT Blood Glucose.: 403 mg/dL (01-19-22 @ 10:37)                            14.1   9.60  )-----------( 239      ( 20 Jan 2022 06:37 )             43.7       01-20    133<L>  |  101  |  10  ----------------------------<  319<H>  4.1   |  20<L>  |  0.76    EGFR if : 140  EGFR if non : 121    Ca    8.7      01-20  Mg     1.90     01-20  Phos  3.4     01-20    TPro  6.7  /  Alb  3.8  /  TBili  0.6  /  DBili  x   /  AST  18  /  ALT  23  /  AlkPhos  182<H>  01-20      Thyroid Function Tests:      01-20 Chol 180 Direct LDL -- LDL calculated 123<H> HDL 41 Trig 79        Radiology:                HPI:  31M no PMHx except acid reflux presents to the hospital after he was found to have elevated FS to 600 with his outpatient PMD. Endocrine consulted for new DM.    Initially BG in the 400s with milk DKA but this has since resolved. Was started on a carb consistent diet mid-day today and is receiving basal/bolus insulin.    Last A1c: 10.8%. Patient never before given the diagnosis of DM or pre-diabetes. Not on DM meds. Never saw an Endocrinologist. No microvascular complications. Gets dilated eye exams every other year because he wears glasses, never told of retinopathy. No macrovascular complications. Not engaging in SMBG. Has been having blurred vision, polydipsia, polyuria for the past month. Diet is liberal with a lot of rice, bread, pasta. Drinks juice and soda. Finishing his meals in the hospital, appetite is good. No known personal hx of HF, pancreatitis or UTIs. Has [facility] and lives in [place].    Describes numbness/tingling in fingertips of both hands within past 24 hours since coming to the hospital but otherwise never had this before.      PAST MEDICAL & SURGICAL HISTORY:  Acid reflux    FAMILY HISTORY: DM - Grandmother, 2 uncles, cousins.    Father with CAD and HTN.      Social History: No tobacco use.    Outpatient Medications: Home Medications: Omeprazole      MEDICATIONS  (STANDING):  dextrose 40% Gel 15 Gram(s) Oral once  dextrose 5%. 1000 milliLiter(s) (50 mL/Hr) IV Continuous <Continuous>  dextrose 5%. 1000 milliLiter(s) (100 mL/Hr) IV Continuous <Continuous>  dextrose 50% Injectable 25 Gram(s) IV Push once  dextrose 50% Injectable 12.5 Gram(s) IV Push once  dextrose 50% Injectable 25 Gram(s) IV Push once  glucagon  Injectable 1 milliGRAM(s) IntraMuscular once  insulin glargine Injectable (LANTUS) 13 Unit(s) SubCutaneous at bedtime  insulin lispro (ADMELOG) corrective regimen sliding scale   SubCutaneous three times a day before meals  insulin lispro (ADMELOG) corrective regimen sliding scale   SubCutaneous at bedtime  insulin lispro Injectable (ADMELOG) 4 Unit(s) SubCutaneous three times a day before meals  lactated ringers. 1000 milliLiter(s) (75 mL/Hr) IV Continuous <Continuous>    MEDICATIONS  (PRN):  acetaminophen     Tablet .. 650 milliGRAM(s) Oral every 6 hours PRN Temp greater or equal to 38C (100.4F), Mild Pain (1 - 3)  aluminum hydroxide/magnesium hydroxide/simethicone Suspension 30 milliLiter(s) Oral every 4 hours PRN Dyspepsia  melatonin 3 milliGRAM(s) Oral at bedtime PRN Insomnia  ondansetron Injectable 4 milliGRAM(s) IV Push every 8 hours PRN Nausea and/or Vomiting      Allergies    No Known Allergies    Intolerances      Review of Systems:  Constitutional:  No fever, No chills.  Eye:  No eye pain, + blurring.   Ear/Nose/Mouth/Throat:  No nasal congestion, No sore throat.   Respiratory:  No shortness of breath, No cough, No sputum production, No hemoptysis.   Cardiovascular:  No chest pain, No palpitations.   Gastrointestinal:  No nausea, No vomiting, No diarrhea.   Genitourinary:  No dysuria, No hematuria.   Endocrine:  + excessive thirst, + polyuria.   Musculoskeletal:  No back pain, No decreased range of motion.   Integumentary:  No rash, No skin lesion.   Neurologic:  Alert and oriented X4, No confusion, + numbness.   Additional ROS reviewed and negative except as indicated in HPI.        PHYSICAL EXAM:  VITALS: T(C): 36.5 (01-20-22 @ 12:37)  T(F): 97.7 (01-20-22 @ 12:37), Max: 98.8 (01-19-22 @ 22:20)  HR: 87 (01-20-22 @ 12:37) (87 - 90)  BP: 125/85 (01-20-22 @ 12:37) (125/85 - 142/93)  RR:  (17 - 20)  SpO2:  (95% - 100%)  Wt(kg): --  General: Well-developed male, No acute distress, Speaking full sentences.   Eye:  Extraocular movements are intact, No proptosis or lid lag.   HENT:  Normocephalic.   Neck:  Supple, Non-tender.   Respiratory:  Respirations are non-labored, Symmetric chest wall expansion, Breath sounds are equal.   Cardiovascular:  Normal rate, Regular rhythm, No edema.  Gastrointestinal:  Soft, Non-tender, Non-distended.   Musculoskeletal:  Normal range of motion, No gross joint swelling.   Feet:  Normal by visual exam, Normal pulses, No ulcers.   Integumentary:  Warm, dry.  Mental Status Exam:  Orientedx4, Speech clear and coherent.   Neurologic:  Alert, Orientedx4, Normal motor function, No focal deficits, Cranial Nerves II-XII are grossly intact bilaterally.   Psychiatric:  Cooperative, Appropriate mood & affect.    POCT Blood Glucose.: 248 mg/dL (01-20-22 @ 12:31)  POCT Blood Glucose.: 288 mg/dL (01-20-22 @ 08:37)  POCT Blood Glucose.: 299 mg/dL (01-20-22 @ 03:41)  POCT Blood Glucose.: 321 mg/dL (01-19-22 @ 22:09)  POCT Blood Glucose.: 226 mg/dL (01-19-22 @ 18:27)  POCT Blood Glucose.: 411 mg/dL (01-19-22 @ 11:16)  POCT Blood Glucose.: 403 mg/dL (01-19-22 @ 10:37)                            14.1   9.60  )-----------( 239      ( 20 Jan 2022 06:37 )             43.7       01-20    133<L>  |  101  |  10  ----------------------------<  319<H>  4.1   |  20<L>  |  0.76    EGFR if : 140  EGFR if non : 121    Ca    8.7      01-20  Mg     1.90     01-20  Phos  3.4     01-20    TPro  6.7  /  Alb  3.8  /  TBili  0.6  /  DBili  x   /  AST  18  /  ALT  23  /  AlkPhos  182<H>  01-20      Thyroid Function Tests:      01-20 Chol 180 Direct LDL -- LDL calculated 123<H> HDL 41 Trig 79        Radiology:

## 2022-01-20 NOTE — CONSULT NOTE ADULT - ATTENDING COMMENTS
31M no PMHx except acid reflux presents to the hospital after he was found to have elevated FS to 600 with his outpatient PMD. Endocrine consulted for new DM.  I had a long d/w pt regarding DM and DM complications    -Discussed with patient the importance of Carb consistent diet and exercise as tolerated.    -Recommend nutritional consultation  inpt prior to dc   -Discussed glycemic goal of a1c <7% to prevent microvascular complications of diabetes mellitus and reduce the risk of macrovascular complications.  - Make sure patient knows how to inject insulin and check fingersticks with glucometer (ask bedside RN for teaching)  - Discharge on  Lantus +metformin   - At home, Patient should check FSBG premeals and bedtime. Pt should call their doctor when FSBG <70 or above >400 and or consistently above 200s as changes in the regimen will have to be made.  -pt should call PMD for DM related questions or concerns until pt is seen by CDE or endocrine   -Recommend annual podiatry and ophthalmology follow up.     -------------------------------------------------------------------------------------------------------------------------------------  DISCHARGE RX:  - Glucometer (ACCU_CHECK jeanie Conenct, Ascensia Contour Next EZ or One, Freestyle [place] LITE or OneTouch Verio IQ)  - Glucometer test strips and lancets  (make sure compatible w glucometer), Dispense #100 (or #200) use as directed  -  BD shirley 4mm pen needles  Please send Lantus solsotar pen and humalog kwikpen as test script to check insurance coverage.  Ok to send with current doses and update prior to d/c    Lantus Solostar Pen ___ units before bedtime, dispense 15ml  - if not covered- can try alternating with one of following   tresiba/basaglar/toujeo/Levemir      Patient will also need a glucometer, test strips, lancets, alcohol pads,     --------------------------------------------------------------------------------------------------------------------------------------

## 2022-01-21 VITALS
OXYGEN SATURATION: 100 % | SYSTOLIC BLOOD PRESSURE: 131 MMHG | DIASTOLIC BLOOD PRESSURE: 78 MMHG | HEART RATE: 80 BPM | RESPIRATION RATE: 17 BRPM | TEMPERATURE: 98 F

## 2022-01-21 LAB
ANION GAP SERPL CALC-SCNC: 10 MMOL/L — SIGNIFICANT CHANGE UP (ref 7–14)
BASOPHILS # BLD AUTO: 0.03 K/UL — SIGNIFICANT CHANGE UP (ref 0–0.2)
BASOPHILS NFR BLD AUTO: 0.4 % — SIGNIFICANT CHANGE UP (ref 0–2)
BUN SERPL-MCNC: 13 MG/DL — SIGNIFICANT CHANGE UP (ref 7–23)
CALCIUM SERPL-MCNC: 8.7 MG/DL — SIGNIFICANT CHANGE UP (ref 8.4–10.5)
CHLORIDE SERPL-SCNC: 101 MMOL/L — SIGNIFICANT CHANGE UP (ref 98–107)
CO2 SERPL-SCNC: 23 MMOL/L — SIGNIFICANT CHANGE UP (ref 22–31)
CREAT SERPL-MCNC: 0.8 MG/DL — SIGNIFICANT CHANGE UP (ref 0.5–1.3)
EOSINOPHIL # BLD AUTO: 0.07 K/UL — SIGNIFICANT CHANGE UP (ref 0–0.5)
EOSINOPHIL NFR BLD AUTO: 0.9 % — SIGNIFICANT CHANGE UP (ref 0–6)
GLUCOSE BLDC GLUCOMTR-MCNC: 172 MG/DL — HIGH (ref 70–99)
GLUCOSE BLDC GLUCOMTR-MCNC: 196 MG/DL — HIGH (ref 70–99)
GLUCOSE SERPL-MCNC: 294 MG/DL — HIGH (ref 70–99)
HCT VFR BLD CALC: 41 % — SIGNIFICANT CHANGE UP (ref 39–50)
HGB BLD-MCNC: 13.8 G/DL — SIGNIFICANT CHANGE UP (ref 13–17)
IANC: 4.29 K/UL — SIGNIFICANT CHANGE UP (ref 1.5–8.5)
IMM GRANULOCYTES NFR BLD AUTO: 0.5 % — SIGNIFICANT CHANGE UP (ref 0–1.5)
ISLET CELL512 AB SER-ACNC: SIGNIFICANT CHANGE UP
LYMPHOCYTES # BLD AUTO: 2.38 K/UL — SIGNIFICANT CHANGE UP (ref 1–3.3)
LYMPHOCYTES # BLD AUTO: 31.9 % — SIGNIFICANT CHANGE UP (ref 13–44)
MAGNESIUM SERPL-MCNC: 1.9 MG/DL — SIGNIFICANT CHANGE UP (ref 1.6–2.6)
MCHC RBC-ENTMCNC: 29.1 PG — SIGNIFICANT CHANGE UP (ref 27–34)
MCHC RBC-ENTMCNC: 33.7 GM/DL — SIGNIFICANT CHANGE UP (ref 32–36)
MCV RBC AUTO: 86.3 FL — SIGNIFICANT CHANGE UP (ref 80–100)
MONOCYTES # BLD AUTO: 0.65 K/UL — SIGNIFICANT CHANGE UP (ref 0–0.9)
MONOCYTES NFR BLD AUTO: 8.7 % — SIGNIFICANT CHANGE UP (ref 2–14)
NEUTROPHILS # BLD AUTO: 4.29 K/UL — SIGNIFICANT CHANGE UP (ref 1.8–7.4)
NEUTROPHILS NFR BLD AUTO: 57.6 % — SIGNIFICANT CHANGE UP (ref 43–77)
NRBC # BLD: 0 /100 WBCS — SIGNIFICANT CHANGE UP
NRBC # FLD: 0 K/UL — SIGNIFICANT CHANGE UP
PHOSPHATE SERPL-MCNC: 3.1 MG/DL — SIGNIFICANT CHANGE UP (ref 2.5–4.5)
PLATELET # BLD AUTO: 224 K/UL — SIGNIFICANT CHANGE UP (ref 150–400)
POTASSIUM SERPL-MCNC: 4.1 MMOL/L — SIGNIFICANT CHANGE UP (ref 3.5–5.3)
POTASSIUM SERPL-SCNC: 4.1 MMOL/L — SIGNIFICANT CHANGE UP (ref 3.5–5.3)
RBC # BLD: 4.75 M/UL — SIGNIFICANT CHANGE UP (ref 4.2–5.8)
RBC # FLD: 11.9 % — SIGNIFICANT CHANGE UP (ref 10.3–14.5)
SODIUM SERPL-SCNC: 134 MMOL/L — LOW (ref 135–145)
WBC # BLD: 7.46 K/UL — SIGNIFICANT CHANGE UP (ref 3.8–10.5)
WBC # FLD AUTO: 7.46 K/UL — SIGNIFICANT CHANGE UP (ref 3.8–10.5)

## 2022-01-21 PROCEDURE — 99233 SBSQ HOSP IP/OBS HIGH 50: CPT

## 2022-01-21 PROCEDURE — 99239 HOSP IP/OBS DSCHRG MGMT >30: CPT | Mod: GC

## 2022-01-21 RX ORDER — ISOPROPYL ALCOHOL, BENZOCAINE .7; .06 ML/ML; ML/ML
1 SWAB TOPICAL
Qty: 100 | Refills: 1
Start: 2022-01-21 | End: 2022-03-11

## 2022-01-21 RX ORDER — ENOXAPARIN SODIUM 100 MG/ML
30 INJECTION SUBCUTANEOUS
Qty: 3 | Refills: 0
Start: 2022-01-21 | End: 2022-02-19

## 2022-01-21 RX ORDER — METFORMIN HYDROCHLORIDE 850 MG/1
1 TABLET ORAL
Qty: 28 | Refills: 0
Start: 2022-01-21 | End: 2022-02-03

## 2022-01-21 RX ORDER — INSULIN LISPRO 100/ML
10 VIAL (ML) SUBCUTANEOUS
Qty: 3 | Refills: 0
Start: 2022-01-21 | End: 2022-02-19

## 2022-01-21 RX ORDER — INSULIN LISPRO 100/ML
9 VIAL (ML) SUBCUTANEOUS
Qty: 5 | Refills: 0
Start: 2022-01-21

## 2022-01-21 RX ORDER — ENOXAPARIN SODIUM 100 MG/ML
25 INJECTION SUBCUTANEOUS
Qty: 5 | Refills: 0
Start: 2022-01-21

## 2022-01-21 RX ADMIN — Medication 9 UNIT(S): at 13:00

## 2022-01-21 RX ADMIN — Medication 9 UNIT(S): at 08:48

## 2022-01-21 RX ADMIN — Medication 2: at 12:59

## 2022-01-21 RX ADMIN — Medication 2: at 08:48

## 2022-01-21 NOTE — PROGRESS NOTE ADULT - ASSESSMENT
31M no PMHx except acid reflux presents to the hospital after he was found to have elevated FS to 600 with his outpatient PMD. Endocrine consulted for new DM.    Newly diagnosed T2DM with comDKA  A1c 10.8%  while inpt:  -Increase Lantus to 30 units at bedtime. DO NOT HOLD IF NPO.  -Increase Admelog to 11 units TID pre-meal. HOLD IF NPO.  -moderate Admelog correction scale pre-meal  - moderate Admelog correction scale at bedtime  -Fingerstick BG before meals and bedtime  -Goal -180  -Carbohydrate consistent diet  -RD consult      Discharge plan:  To prepare for dc:  -Discussed with patient the importance of Carb consistent diet and exercise as tolerated.    -Recommend nutritional consultation  inpt prior to dc   -Discussed glycemic goal of a1c <7% to prevent microvascular complications of diabetes mellitus and reduce the risk of macrovascular complications.  - Make sure patient knows how to inject insulin and check fingersticks with glucometer (ask bedside RN for teaching)  - Discharge  Lantus 30 units at night  + novolog 10 before meals +metformin   - At home, Patient should check FSBG premeals and bedtime. Pt should call their doctor when FSBG <70 or above >400 and or consistently above 200s as changes in the regimen will have to be made.  -pt should call PMD for DM related questions or concerns until pt is seen by CDE or endocrine   -Recommend annual podiatry and ophthalmology follow up.     -Likely to discharge patient home on basal insulin +novolog  plus metformin. Start with 500mg metformin BID. If tolerates, increase to 1g BID after 1 week.  Please send Lantus solostar pen and Humalog Kwikpen as test scripts to check insurance coverage.  Okay to send with current doses and update prior to d/c. "Lantus Solostar Pen 25 units before bedtime, dispense 15mL." If not covered, can try alternating with one of following: tresiba/basaglar/toujeo/Levemir.  Humalog Flexpen up to ___ dispense 15ml- can try alternating with one of following  if not covered try alternative: novolog/apidra/admelog/fiasp    Patient will also need a glucometer, test strips, lancets, alcohol pads,     --------------------------------------------------------------------------------------------------------------------------------------      -Patient will need education on how to self-administer insulin, how to check FSBG, as well as hypoglycemia management. Patient to call doctor with persistent high or low BG at home.   -Will need to have glucometer (ACCU_CHECK Briseyda Connect, Ascensia Contour Next EZ or One, Freestyle [place] LITE or OneTouch Verio IQ), test strips and lancets (make sure compatible with glucometer, dispense #100 (or #200) and use as directed) sent to pharmacy prior to discharge.  -Will also need BD shirley 4mm pen needles  -Recommend routine outpatient ophthalmology and endocrinology f/u. Patient can f/u outpatient with Endocrinology faculty practice [facility]: Endocrinology at [place].   [address] [facility], [address]  [place] [address]  Phone: [phone]  Fax: [phone]        HTN  -Outpatient goal BP <130/80. Management per primary team.      HLD  -Lipids reviewed  diet and lifestyle modification, monitor outpt         Nissa Sevilla MD  Attending Physician   Department of Endocrinology, Diabetes and Metabolism     Pager  [phone] [please provide 10 digit call back number]  RYANN 9-5  [email or web address]  Nights and weekends: [phone]  Please note that this patient may be followed by a different provider tomorrow.   If no answer or after hours, please contact [phone].  For final dc reccomendations, please call [phone] or page the endocrine fellow on call. 31M no PMHx except acid reflux presents to the hospital after he was found to have elevated FS to 600 with his outpatient PMD. Endocrine consulted for new DM.    Newly diagnosed T2DM with mild DKA  A1c 10.8%  while inpt:  -Increase Lantus to 30 units at bedtime. DO NOT HOLD IF NPO.  -Increase Admelog to 11 units TID pre-meal. HOLD IF NPO.  -moderate Admelog correction scale pre-meal  - moderate Admelog correction scale at bedtime  -Fingerstick BG before meals and bedtime  -Goal -180  -Carbohydrate consistent diet  -RD consult      Discharge plan:  To prepare for dc:  -Discussed with patient the importance of Carb consistent diet and exercise as tolerated.    -Recommend nutritional consultation  inpt prior to dc   -Discussed glycemic goal of a1c <7% to prevent microvascular complications of diabetes mellitus and reduce the risk of macrovascular complications.  - Make sure patient knows how to inject insulin and check fingersticks with glucometer (ask bedside RN for teaching)  - Discharge  Lantus 30 units at night  + novolog 10 before meals +metformin   - At home, Patient should check FSBG premeals and bedtime. Pt should call their doctor when FSBG <70 or above >400 and or consistently above 200s as changes in the regimen will have to be made.  -pt should call PMD for DM related questions or concerns until pt is seen by CDE or endocrine   -Recommend annual podiatry and ophthalmology follow up.     given concern for DKA - would reccomend dc on basal bolus for safety in the acute period  should have outpt endocrine followup as well and followup labs for auto-immune DM and check cpeptide levels   pt should be aware of signs and symptoms of DKA     -Likely to discharge patient home on basal insulin +novolog  plus metformin. Start with 500mg metformin BID. If tolerates, increase to 1g BID after 1 week.  Please send Lantus solostar pen and Humalog Kwikpen as test scripts to check insurance coverage.  Okay to send with current doses and update prior to d/c. "Lantus Solostar Pen 25 units before bedtime, dispense 15mL." If not covered, can try alternating with one of following: tresiba/basaglar/toujeo/Levemir.  Humalog Flexpen up to ___ dispense 15ml- can try alternating with one of following  if not covered try alternative: novolog/apidra/admelog/fiasp    Patient will also need a glucometer, test strips, lancets, alcohol pads, ketone strips and glucagon     --------------------------------------------------------------------------------------------------------------------------------------      -Patient will need education on how to self-administer insulin, how to check FSBG, as well as hypoglycemia management. Patient to call doctor with persistent high or low BG at home.   -Will need to have glucometer (ACCU_CHECK Briseyda Connect, Ascensia Contour Next EZ or One, Freestyle [place] LITE or OneTouch Verio IQ), test strips and lancets (make sure compatible with glucometer, dispense #100 (or #200) and use as directed) sent to pharmacy prior to discharge.  -Will also need BD shirley 4mm pen needles  -Recommend routine outpatient ophthalmology and endocrinology f/u. Patient can f/u outpatient with Endocrinology faculty practice [facility]: Endocrinology at [place].   [address] [place] [address], [address]  [place] [address]  Phone: [phone]  Fax: [phone]        HTN  -Outpatient goal BP <130/80. Management per primary team.      HLD  -Lipids reviewed  diet and lifestyle modification, monitor outpt         Nissa Sevilla MD  Attending Physician   Department of Endocrinology, Diabetes and Metabolism     Pager  [phone] [please provide 10 digit call back number]  RYANN 9-5  [email or web address]  Nights and weekends: [phone]  Please note that this patient may be followed by a different provider tomorrow.   If no answer or after hours, please contact [phone].  For final dc reccomendations, please call [phone] or page the endocrine fellow on call. 31M no PMHx except acid reflux presents to the hospital after he was found to have elevated FS to 600 with his outpatient PMD. Endocrine consulted for new DM.    Newly diagnosed T2DM with mild DKA  A1c 10.8%  while inpt:  -Increase Lantus to 30 units at bedtime. DO NOT HOLD IF NPO.  -Increase Admelog to 11 units TID pre-meal. HOLD IF NPO.  -moderate Admelog correction scale pre-meal  - moderate Admelog correction scale at bedtime  -Fingerstick BG before meals and bedtime  -Goal -180  -Carbohydrate consistent diet  -RD consult      Discharge plan:  To prepare for dc:  -Discussed with patient the importance of Carb consistent diet and exercise as tolerated.    -Recommend nutritional consultation  inpt prior to dc   -Discussed glycemic goal of a1c <7% to prevent microvascular complications of diabetes mellitus and reduce the risk of macrovascular complications.  - Make sure patient knows how to inject insulin and check fingersticks with glucometer (ask bedside RN for teaching)  - Discharge  Lantus 30 units at night  + novolog 10 before meals +metformin   - At home, Patient should check FSBG premeals and bedtime. Pt should call their doctor when FSBG <70 or above >400 and or consistently above 200s as changes in the regimen will have to be made.  -pt should call PMD for DM related questions or concerns until pt is seen by CDE or endocrine   -Recommend annual podiatry and ophthalmology follow up.     given concern for DKA - would reccomend dc on basal bolus for safety in the acute period  should have outpt endocrine followup as well and followup labs for auto-immune DM and check cpeptide levels   pt should be aware of signs and symptoms of DKA     -Likely to discharge patient home on basal insulin +novolog  plus metformin. Start with 500mg metformin BID. If tolerates, increase to 1g BID after 1 week.  Please send Lantus solostar pen and Humalog Kwikpen as test scripts to check insurance coverage.  Okay to send with current doses and update prior to d/c. "Lantus Solostar Pen 25 units before bedtime, dispense 15mL." If not covered, can try alternating with one of following: tresiba/basaglar/toujeo/Levemir.  Humalog Flexpen up to ___ dispense 15ml- can try alternating with one of following  if not covered try alternative: novolog/apidra/admelog/fiasp    Patient will also need a glucometer, test strips, lancets, alcohol pads, ketone strips and glucagon     --------------------------------------------------------------------------------------------------------------------------------------      -Patient will need education on how to self-administer insulin, how to check FSBG, as well as hypoglycemia management. Patient to call doctor with persistent high or low BG at home.   -Will need to have glucometer (ACCU_CHECK Briseyda Connect, Ascensia Contour Next EZ or One, Freestyle [place] LITE or OneTouch Verio IQ), test strips and lancets (make sure compatible with glucometer, dispense #100 (or #200) and use as directed) sent to pharmacy prior to discharge.  -Will also need BD shirley 4mm pen needles  -Recommend routine outpatient ophthalmology and endocrinology f/u. Patient can f/u outpatient with Endocrinology faculty practice [facility]: Endocrinology at [place].   [address] [place] [address], [address]  [place] [address]  Phone: [phone]  Fax: [phone]        HTN  -Outpatient goal BP <130/80. Management per primary team.      HLD  -Lipids reviewed  diet and lifestyle modification, monitor outpt            High risk patient with high level decision making, at high risk of worsening microvascular and macrovascular complications.  Endocrine team consulted for uncontrolled diabetes. Patient is high risk with high level decision making due to uncontrolled diabetes which places patient at high risk for cardiovascular and cerebrovascular events. Patient with lability of glucose requiring close monitoring and insulin adjustments.      Nissa Sevilla MD  Attending Physician   Department of Endocrinology, Diabetes and Metabolism     Pager  [phone] [please provide 10 digit call back number]  RYANN 9-5  [email or web address]  Nights and weekends: [phone]  Please note that this patient may be followed by a different provider tomorrow.   If no answer or after hours, please contact [phone].  For final dc reccomendations, please call [phone] or page the endocrine fellow on call.

## 2022-01-21 NOTE — PROGRESS NOTE ADULT - PROBLEM SELECTOR PLAN 1
Patient with A1c 10.8 i/s/o no prior DM diagnosis  - Endo following, appreciate recs  - Current regiment: basal insulin 25u with pre-meal 9u  - Will provide insulin teaching today  - Sent insulin and DM supplies to [facility] for de la cruz check  - f/u Islet cell antibody, glutamic acid, and zinc transporter.
Patient with FG in 400s and elevated beta-hydroxybuterate to 1.8 without acidosis. Unclear etiology of exacerbation. Patient without any signs of infection. UA negative  - s/p 7u of insulin and 2L LR with improving glucose  - basal insulin 13u with pre-meal 4u  - C/w 75 cc LR for now  - Endo consulted, appreciate recs

## 2022-01-21 NOTE — DIETITIAN INITIAL EVALUATION ADULT. - PROBLEM SELECTOR PLAN 3
Patient with elevated alk phos to 237, unclear etiology. Denies any abdominal pain. Some reported elevation in alkphos in patient with diabetes. Differential diagnosis to consider hematochromatosis.  - F/u gtt in the AM  - CTM at this time

## 2022-01-21 NOTE — DIETITIAN INITIAL EVALUATION ADULT. - PERTINENT LABORATORY DATA
01-21 @ 07:28: Na 134<L>, BUN 13, Cr 0.80, <H> HbA1c --10.8    High     CAPILLARY BLOOD GLUCOSE  POCT Blood Glucose.: 196 mg/dL (21 Jan 2022 08:22)  POCT Blood Glucose.: 208 mg/dL (20 Jan 2022 21:45)  POCT Blood Glucose.: 245 mg/dL (20 Jan 2022 17:46)  POCT Blood Glucose.: 248 mg/dL (20 Jan 2022 12:31)

## 2022-01-21 NOTE — PROGRESS NOTE ADULT - ATTENDING COMMENTS
31 M no PMHx presents to the hospital in mild DKA. No clear evidence of infection or MI. Patient with undiagnosed diabetes mellitus, unclear type at this time. Started glargine and lispro. A1c >10%; endocrine recs appreciated.     Still hyperglycemic but FS glucose overall improving; insulin/diabetes regimen may be further adjusted as outpatient. Will ensure patient can demonstrate proper injection technique prior to discharge.    DC planning in progress.    I spent 35 minutes counseling this patient and coordinating their discharge.
31 M no PMHx presents to the hospital in mild DKA. No clear evidence of infection or MI. Patient with undiagnosed diabetes mellitus, unclear type at this time. Started glargine and lispro. A1c >10%; endocrine c/s pending to assist w/management.

## 2022-01-21 NOTE — DISCHARGE NOTE PROVIDER - HOSPITAL COURSE
31M no PMHx presents to the hospital after he was found to have elevated FS to 600 with his outpatient PMD. Patient reports that for the past 3 weeks he has been having fatigue, blurry vision, frequent urination, and thirst. Patient works as a [employer]  at night. He works from 3pm to 1am. He usually skips breakfast and lunch and eats fast food for dinner, which he picks up on his way home. His diet has been like this for past few years. Patient does not exercise due to his busy schedule. He reports that for the 3 weeks, his thirst has been really severe and he has been drinking about a gallon of [place] before bedtime. Today, he also experienced some shakiness when he woke up, which was new for him. Endorses 30lbs weight loss since he started noticing increase in urination. He denies any HA, recent fevers, chills, night sweats, SOB, CP, abdominal pain, n/v/d, constipation, or any other symptoms.     Patient has a extensive family history of DM on maternal and paternal sides, including parents, uncles and cousins with DM. His father also has HTN and CAD with stent placement.    Hospital Course  In the ED, VSS. FS found to be in 400 with ketosis without acidosis. Patient s/p 2L LR and 7u lispro. Patient admitted to medicine for further management. Patient started on low-weight based basal/bolus with correction. Endocrine consulted. Endocrine recommended increased basal insulin 25u with pre-meal 9u. Patient received nutrition consult and insulin teaching. It was decided to discharge patient on 500mg metformin BID (uptitrate outpatient to 1g) and ?? basal insulin. Patient demonstrate good technique to self administer insulin. He is currently stable to be discharged. He will follow-up with Endocrinology and PCP upon discharge for further management. 31M no PMHx presents to the hospital after he was found to have elevated FS to 600 with his outpatient PMD. Patient reports that for the past 3 weeks he has been having fatigue, blurry vision, frequent urination, and thirst. Patient works as a [employer]  at night. He works from 3pm to 1am. He usually skips breakfast and lunch and eats fast food for dinner, which he picks up on his way home. His diet has been like this for past few years. Patient does not exercise due to his busy schedule. He reports that for the 3 weeks, his thirst has been really severe and he has been drinking about a gallon of [place] before bedtime. Today, he also experienced some shakiness when he woke up, which was new for him. Endorses 30lbs weight loss since he started noticing increase in urination. He denies any HA, recent fevers, chills, night sweats, SOB, CP, abdominal pain, n/v/d, constipation, or any other symptoms.     Patient has a extensive family history of DM on maternal and paternal sides, including parents, uncles and cousins with DM. His father also has HTN and CAD with stent placement.    Hospital Course  In the ED, VSS. FS found to be in 400 with ketosis without acidosis. Patient s/p 2L LR and 7u lispro. Patient admitted to medicine for further management. Patient started on low-weight based basal/bolus with correction. Endocrine consulted. Endocrine recommended increased basal insulin 25u with pre-meal 9u. Patient received nutrition consult and insulin teaching. It was decided to discharge patient on 500mg metformin BID (uptitrate outpatient to 1g) and 30u lantus insulin. Patient demonstrate good technique to self administer insulin. He is currently stable to be discharged. He will follow-up with Endocrinology and PCP upon discharge for further management. 31M no PMHx presents to the hospital after he was found to have elevated FS to 600 with his outpatient PMD. Patient reports that for the past 3 weeks he has been having fatigue, blurry vision, frequent urination, and thirst. Patient works as a [employer]  at night. He works from 3pm to 1am. He usually skips breakfast and lunch and eats fast food for dinner, which he picks up on his way home. His diet has been like this for past few years. Patient does not exercise due to his busy schedule. He reports that for the 3 weeks, his thirst has been really severe and he has been drinking about a gallon of [place] before bedtime. Today, he also experienced some shakiness when he woke up, which was new for him. Endorses 30lbs weight loss since he started noticing increase in urination. He denies any HA, recent fevers, chills, night sweats, SOB, CP, abdominal pain, n/v/d, constipation, or any other symptoms.     Patient has a extensive family history of DM on maternal and paternal sides, including parents, uncles and cousins with DM. His father also has HTN and CAD with stent placement.    Hospital Course  In the ED, VSS. FS found to be in 400 with ketosis without acidosis. Patient s/p 2L LR and 7u lispro. Patient admitted to medicine for further management. Patient started on low-weight based basal/bolus with correction. Endocrine consulted. Endocrine recommended increased basal insulin 25u with pre-meal 9u. Patient received nutrition consult and insulin teaching. It was decided to discharge patient on 500mg metformin BID (uptitrate outpatient to 1g) and 30u lantus insulin and 10u premeal. Patient demonstrate good technique to self administer insulin. He is currently stable to be discharged. He will follow-up with Endocrinology and PCP upon discharge for further management. 30 y/o M no PMHx presents to the hospital after he was found to have elevated FS to 600 with his outpatient PMD. Patient reports that for the past 3 weeks he has been having fatigue, blurry vision, frequent urination, and thirst. Patient works as a [employer]  at night. He works from 3pm to 1am. He usually skips breakfast and lunch and eats fast food for dinner, which he picks up on his way home. His diet has been like this for past few years. Patient does not exercise due to his busy schedule. He reports that for the 3 weeks, his thirst has been really severe and he has been drinking about a gallon of [place] before bedtime. Today, he also experienced some shakiness when he woke up, which was new for him. Endorses 30lbs weight loss since he started noticing increase in urination. He denies any HA, recent fevers, chills, night sweats, SOB, CP, abdominal pain, n/v/d, constipation, or any other symptoms.     Patient has a extensive family history of DM on maternal and paternal sides, including parents, uncles and cousins with DM. His father also has HTN and CAD with stent placement.    Hospital Course  In the ED, VSS. FS found to be in 400 with ketosis without acidosis. Patient s/p 2L LR and 7u lispro. Patient admitted to medicine for further management. Patient started on low-weight based basal/bolus with correction. Endocrine consulted. Endocrine recommended increased basal insulin 25u with pre-meal 9u. Patient received nutrition consult and insulin teaching. It was decided to discharge patient on 500mg metformin BID (uptitrate outpatient to 1g) and 30u lantus insulin and 10u premeal. Patient demonstrated good technique to self-administer insulin. He is currently stable to be discharged. He will follow-up with Endocrinology and PCP upon discharge for further management.

## 2022-01-21 NOTE — DISCHARGE NOTE NURSING/CASE MANAGEMENT/SOCIAL WORK - NSDCPEFALRISK_GEN_ALL_CORE
For information on Fall & Injury Prevention, visit: [email or web address] OR  [email or web address] OR  https://www.cdc.gov/steadi/patient.html

## 2022-01-21 NOTE — DISCHARGE NOTE PROVIDER - NSFOLLOWUPCLINICS_GEN_ALL_ED_FT
[facility] Endocrinology  Endocrinology  [address] [place] [address]  Phone: [phone]  Fax:   Follow Up Time: Routine

## 2022-01-21 NOTE — PROGRESS NOTE ADULT - PROBLEM SELECTOR PLAN 3
Patient with elevated alk phos to 237, unclear etiology. Denies any abdominal pain. Some reported elevation in alkphos in patient with diabetes. Differential diagnosis to consider hematochromatosis.  - ggt WNL, unlikely to be liver pathology  - Recommend outpatient follow-up for alk phos  - CTM at this time

## 2022-01-21 NOTE — DIETITIAN INITIAL EVALUATION ADULT. - LITERATURE/VIDEOS GIVEN
Written materials provided: Heart Healthy Consistent CHO Diet, Low Sodium Nutrition Therapy, Diabetes Label Reading Tips, CHO Counting For People with Diabetes.

## 2022-01-21 NOTE — DISCHARGE NOTE PROVIDER - CARE PROVIDER_API CALL
Tay Santa  [address] [place] [address]  Phone: [phone]  Fax: (   )    -  Established Patient  Follow Up Time: 1 week

## 2022-01-21 NOTE — DISCHARGE NOTE PROVIDER - PROVIDER TOKENS
FREE:[LAST:[Arina],FIRST:[Tay],PHONE:[phone]],FAX:[(   )    -],ADDRESS:[[address] [place] [address]],FOLLOWUP:[1 week],ESTABLISHEDPATIENT:[T]]

## 2022-01-21 NOTE — DIETITIAN INITIAL EVALUATION ADULT. - ORAL INTAKE PTA/DIET HISTORY
Pt was experiencing blurred vision, polydipsia, polyuria x1 month. Works from 3pm-11:30 pm, wakes at 1 pm. Does not eat breakfast or lunch and eats daily takeout at dinner. Drinks juice and soda daily. Does not like vegetables. Endorses 30# weight loss from last obtained weight in 12/21. Current stated weight 250# which was obtained in PMD office visit. Diet is high in sodium and increased saturated fat. Pt is used to eating taylor, sausage, red meats. No physical activity aside from work.

## 2022-01-21 NOTE — DIETITIAN INITIAL EVALUATION ADULT. - PROBLEM SELECTOR PLAN 1
Patient with FG in 400s and elevated beta-hydroxybuterate to 1.8 without acidosis. Unclear etiology of exacerbation. Patient without any signs of infection. UA negative  - s/p 7u of insulin and 2L LR with improving glucose  - Will give basal insulin 13u with pre-meal 4u  - C/w 75 cc LR for now  - Endo consult in the AM

## 2022-01-21 NOTE — DIETITIAN INITIAL EVALUATION ADULT. - PERTINENT MEDS FT
MEDICATIONS  (STANDING):  dextrose 40% Gel 15 Gram(s) Oral once  dextrose 5%. 1000 milliLiter(s) (50 mL/Hr) IV Continuous <Continuous>  dextrose 5%. 1000 milliLiter(s) (100 mL/Hr) IV Continuous <Continuous>  dextrose 50% Injectable 25 Gram(s) IV Push once  dextrose 50% Injectable 12.5 Gram(s) IV Push once  dextrose 50% Injectable 25 Gram(s) IV Push once  glucagon  Injectable 1 milliGRAM(s) IntraMuscular once  insulin glargine Injectable (LANTUS) 25 Unit(s) SubCutaneous at bedtime  insulin lispro (ADMELOG) corrective regimen sliding scale   SubCutaneous three times a day before meals  insulin lispro (ADMELOG) corrective regimen sliding scale   SubCutaneous at bedtime  insulin lispro Injectable (ADMELOG) 9 Unit(s) SubCutaneous three times a day before meals  lactated ringers. 1000 milliLiter(s) (75 mL/Hr) IV Continuous <Continuous>

## 2022-01-21 NOTE — DISCHARGE NOTE PROVIDER - NSDCCPCAREPLAN_GEN_ALL_CORE_FT
PRINCIPAL DISCHARGE DIAGNOSIS  Diagnosis: Hyperglycemia  Assessment and Plan of Treatment:        PRINCIPAL DISCHARGE DIAGNOSIS  Diagnosis: Hyperglycemia  Assessment and Plan of Treatment: You presented to the hospital with complaints of elevated glucose to 600s in outpatient labs. In the hospital, you wre found to have sugar in the range of 400s. We started you on insulin for better sugar management. We did an A1c for you and found that your A1c was 10.8. Normal A1c is  less than 5.7. You meet criteria for diabetes. We contacted endocrine for further management. Endocrinology evaluated your case and recommended Metformin 500mg and ?? Insulin. Please follow-up with your primary care doctor in 1 week to potentially uptitrate metformin to 1g if you tolerate it. Please also follow-up with endocrinology for further management of your diabetes  Please follow-up with your doctor in 1-2 weeks. If your symptoms worsen or return, please contact your doctor immediately. If you are not able to reach your doctor, please go to the nearest emergency department.        SECONDARY DISCHARGE DIAGNOSES  Diagnosis: Elevated alkaline phosphatase level  Assessment and Plan of Treatment: During admission, you were found to have elevated alkaline phosphatase levels. It can be elevated for many reason; two main etiologies include liver and bone problems. We did further work-up for liver, which was negative. It is unclear what the etiology of your elevated levels is. Please follow-up with your PMD for repeat blood work and further evaluation as necessary.     PRINCIPAL DISCHARGE DIAGNOSIS  Diagnosis: Hyperglycemia  Assessment and Plan of Treatment: You presented to the hospital with complaints of elevated glucose to 600s in outpatient labs. In the hospital, you wre found to have sugar in the range of 400s. We started you on insulin for better sugar management. We did an A1c for you and found that your A1c was 10.8. Normal A1c is  less than 5.7. You meet criteria for diabetes. We contacted endocrine for further management. Endocrinology evaluated your case and recommended Metformin 500mg twice a day and 30 units lantus at night. Please follow-up with your primary care doctor in 1 week to potentially uptitrate metformin to 1g twice a day if you tolerate it. Please also follow-up with endocrinology for further management of your diabetes  Please follow-up with your doctor and use your medications as directed. If your symptoms worsen or return, please contact your doctor immediately. If you are not able to reach your doctor, please go to the nearest emergency department.        SECONDARY DISCHARGE DIAGNOSES  Diagnosis: Elevated alkaline phosphatase level  Assessment and Plan of Treatment: During admission, you were found to have elevated alkaline phosphatase levels. It can be elevated for many reason; two main etiologies include liver and bone problems. We did further work-up for liver, which was negative. It is unclear what the etiology of your elevated levels is. Please follow-up with your PMD for repeat blood work and further evaluation as necessary.     PRINCIPAL DISCHARGE DIAGNOSIS  Diagnosis: Hyperglycemia  Assessment and Plan of Treatment: You presented to the hospital with complaints of elevated glucose to 600s in outpatient labs. In the hospital, you wre found to have sugar in the range of 400s. We started you on insulin for better sugar management. We did an A1c for you and found that your A1c was 10.8. Normal A1c is  less than 5.7. You meet criteria for diabetes. We contacted endocrine for further management. Endocrinology evaluated your case and recommended Metformin 500mg twice a day and 30 units lantus at night. You will also need to take 15u admelog pre-meal Please follow-up with your primary care doctor in 1 week to potentially uptitrate metformin to 1g twice a day if you tolerate it. You should also work with your PCP to decrease your pre-meal requirement as tolerated. Please also follow-up with endocrinology for further management of your diabetes  Please follow-up with your doctor and use your medications as directed. If your symptoms worsen or return, please contact your doctor immediately. If you are not able to reach your doctor, please go to the nearest emergency department.        SECONDARY DISCHARGE DIAGNOSES  Diagnosis: Elevated alkaline phosphatase level  Assessment and Plan of Treatment: During admission, you were found to have elevated alkaline phosphatase levels. It can be elevated for many reason; two main etiologies include liver and bone problems. We did further work-up for liver, which was negative. It is unclear what the etiology of your elevated levels is. Please follow-up with your PMD for repeat blood work and further evaluation as necessary.

## 2022-01-21 NOTE — DISCHARGE NOTE PROVIDER - NSDCMRMEDTOKEN_GEN_ALL_CORE_FT
alcohol swabs : Please perform price check. Please do not dispense, dosing not finalized. Other brand item are also acceptable. Please page [phone] with price    glucometer (per patient&#x27;s insurance): Please perform price check. Please do not dispense, dosing not finalized. Other brand item are also acceptable. Please page [phone] with price  HumaLOG KwikPen 100 units/mL injectable solution: 9 unit(s) injectable 3 times a day (before meals)     TEST SCRIPT DO NOT FILL  Insulin Pen Needles, 4mm: Please perform price check. Please do not dispense, dosing not finalized. Other brand item are also acceptable. Please page [phone] with price  lancets: Please perform price check. Please do not dispense, dosing not finalized. Other brand item are also acceptable. Please page [phone] with de la cruz  Lantus Solostar Pen 100 units/mL subcutaneous solution: 25 unit(s) subcutaneous once a day (at bedtime)     TEST SCRIPT DO NOT FILL  test strips (per patient&#x27;s insurance): Please perform price check. Please do not dispense, dosing not finalized. Other brand item are also acceptable. Please page [phone] with price   alcohol swabs : Apply topically to affected area 4 times a day   glucometer (per patient&#x27;s insurance): Test blood sugars four times a day. Dispense #1 glucometer.  Insulin Pen Needles, 4mm: 1 application subcutaneously 4 times a day. ** Use with insulin pen **   lancets: 1 application subcutaneously 4 times a day   Lantus Solostar Pen 100 units/mL subcutaneous solution: 30 unit(s) subcutaneous once a day (at bedtime)   metFORMIN 500 mg oral tablet: 1 tab(s) orally 2 times a day   test strips (per patient&#x27;s insurance): 1 application subcutaneously 4 times a day. ** Compatible with patient&#x27;s glucometer **   alcohol swabs : Apply topically to affected area 4 times a day   glucometer (per patient&#x27;s insurance): Test blood sugars four times a day. Dispense #1 glucometer.  HumaLOG KwikPen 100 units/mL injectable solution: 10 unit(s) subcutaneous 3 times a day (with meals)   Insulin Pen Needles, 4mm: 1 application subcutaneously 4 times a day. ** Use with insulin pen **   lancets: 1 application subcutaneously 4 times a day   Lantus Solostar Pen 100 units/mL subcutaneous solution: 30 unit(s) subcutaneous once a day (at bedtime)   metFORMIN 500 mg oral tablet: 1 tab(s) orally 2 times a day   test strips (per patient&#x27;s insurance): 1 application subcutaneously 4 times a day. ** Compatible with patient&#x27;s glucometer **

## 2022-01-21 NOTE — DISCHARGE NOTE NURSING/CASE MANAGEMENT/SOCIAL WORK - PATIENT PORTAL LINK FT
You can access the FollowMyHealth Patient Portal offered by [facility] by registering at the following website: [email or web address]. By joining [employer]’s FollowMyHealth portal, you will also be able to view your health information using other applications (apps) compatible with our system.

## 2022-01-21 NOTE — PROGRESS NOTE ADULT - PROBLEM SELECTOR PLAN 2
Patient with FG in 400s and elevated beta-hydroxybuterate to 1.8 without acidosis. Unclear etiology of exacerbation. Patient without any signs of infection. UA negative  - s/p 7u of insulin and 2L LR with improving glucose  - Now on basal insulin 25u with pre-meal 9u  - Endo following, appreciate recs  - Now resolved

## 2022-01-21 NOTE — DIETITIAN INITIAL EVALUATION ADULT. - OTHER INFO
Per chart----31 M no PMHx presents to the hospital after he was referred to the ED by his PMD for elevated glucose. Found to have FS of 403 and elevated beta-hydroxybuterate without acidosis consistent with diabetic ketosis. Patient s/p 2L LR and 7u lispro with improvement in his sugar. Admitted to medicine for further management.    Received nutrition consult for new Diabetes Dx and Education. Pt's FS to 600 mg/dL in MD office. Reports to feeling hungry, but does not like what he received for breakfast and is awaiting a different breakfast selection. Denies chewing, swallowing difficulties or any nausea, vomiting, diarrhea, constipation. Being followed by Endo, BS Goal 100-180 mg/dL.  Pt is aware of current A1C result, informed of goal to work towards. Pt is very overwhelmed with Dx and changes that he has to make. Encouraged to make small changes that will be long lasting. Informed of the importance of glycemic control to prevent, delay micro/macrovascular complications. Discussed mixed meal plate method with taking into consideration portion sizes and to include pro/fiber/healthy fat with every meal intake as possible. Suggested meal planning as able on days off, decreasing take out meals and including unsweetened beverages. Pt encouraged to follow up with Endocrine post D/C.

## 2022-01-21 NOTE — PROGRESS NOTE ADULT - SUBJECTIVE AND OBJECTIVE BOX
Chief Complaint/Follow-up on:   DM      Subjective:  POC blood glucose and insulin use reviewed.  pt feels well, ready for dc today   we discussed DM and need for DM control outpt         MEDICATIONS  (STANDING):  dextrose 40% Gel 15 Gram(s) Oral once  dextrose 5%. 1000 milliLiter(s) (50 mL/Hr) IV Continuous <Continuous>  dextrose 5%. 1000 milliLiter(s) (100 mL/Hr) IV Continuous <Continuous>  dextrose 50% Injectable 25 Gram(s) IV Push once  dextrose 50% Injectable 12.5 Gram(s) IV Push once  dextrose 50% Injectable 25 Gram(s) IV Push once  glucagon  Injectable 1 milliGRAM(s) IntraMuscular once  insulin glargine Injectable (LANTUS) 25 Unit(s) SubCutaneous at bedtime  insulin lispro (ADMELOG) corrective regimen sliding scale   SubCutaneous three times a day before meals  insulin lispro (ADMELOG) corrective regimen sliding scale   SubCutaneous at bedtime  insulin lispro Injectable (ADMELOG) 9 Unit(s) SubCutaneous three times a day before meals  lactated ringers. 1000 milliLiter(s) (75 mL/Hr) IV Continuous <Continuous>    MEDICATIONS  (PRN):  acetaminophen     Tablet .. 650 milliGRAM(s) Oral every 6 hours PRN Temp greater or equal to 38C (100.4F), Mild Pain (1 - 3)  aluminum hydroxide/magnesium hydroxide/simethicone Suspension 30 milliLiter(s) Oral every 4 hours PRN Dyspepsia  melatonin 3 milliGRAM(s) Oral at bedtime PRN Insomnia  ondansetron Injectable 4 milliGRAM(s) IV Push every 8 hours PRN Nausea and/or Vomiting      PHYSICAL EXAM:  VITALS: T(C): 36.5 (01-21-22 @ 13:01)  T(F): 97.7 (01-21-22 @ 13:01), Max: 98.2 (01-20-22 @ 21:42)  HR: 80 (01-21-22 @ 13:01) (80 - 92)  BP: 131/78 (01-21-22 @ 13:01) (131/78 - 142/98)  RR:  (17 - 18)  SpO2:  (99% - 100%)  Wt(kg): --  GENERAL: NAD, well-groomed, well-developed  EYES: No proptosis, no injection  RESPIRATORY: Clear to auscultation bilaterally; No rales, rhonchi, wheezing, or rubs  CARDIOVASCULAR: Regular rate and rhythm; No murmurs; no peripheral edema  GI: Soft, nontender, non distended, normal bowel sounds    POCT Blood Glucose.: 172 mg/dL (01-21-22 @ 12:37)  POCT Blood Glucose.: 196 mg/dL (01-21-22 @ 08:22)  POCT Blood Glucose.: 208 mg/dL (01-20-22 @ 21:45)  POCT Blood Glucose.: 245 mg/dL (01-20-22 @ 17:46)  POCT Blood Glucose.: 248 mg/dL (01-20-22 @ 12:31)  POCT Blood Glucose.: 288 mg/dL (01-20-22 @ 08:37)  POCT Blood Glucose.: 299 mg/dL (01-20-22 @ 03:41)  POCT Blood Glucose.: 321 mg/dL (01-19-22 @ 22:09)  POCT Blood Glucose.: 226 mg/dL (01-19-22 @ 18:27)  POCT Blood Glucose.: 411 mg/dL (01-19-22 @ 11:16)  POCT Blood Glucose.: 403 mg/dL (01-19-22 @ 10:37)    01-21    134<L>  |  101  |  13  ----------------------------<  294<H>  4.1   |  23  |  0.80    EGFR if : 137  EGFR if non : 118    Ca    8.7      01-21  Mg     1.90     01-21  Phos  3.1     01-21    TPro  6.7  /  Alb  3.8  /  TBili  0.6  /  DBili  x   /  AST  18  /  ALT  23  /  AlkPhos  182<H>  01-20          Thyroid Function Tests:                      
**************************************  Adelfo Morris, PGY-1  Pager: # [phone] / [phone]  Senior Resident: Kwesi Jaime  After 7PM, please contact night float at [phone] or [phone]  **************************************    INTERVAL HPI/OVERNIGHT EVENTS:  Patient was seen and examined at bedside. Patient with some numbness in his hands. Reports his finger tips turn blue and he has numbness, which is normal for him. NF evaluated the patient without any color changes. O2 was stable. Patient reports feeling better this AM. No complaints    VITAL SIGNS:  T(F): 97.6 (22 @ 00:38)  HR: 87 (22 @ 00:38)  BP: 141/95 (22 @ 00:38)  RR: 17 (22 @ 00:38)  SpO2: 100% (22 @ 00:38)    PHYSICAL EXAM:    Constitutional: WDWN, NAD  HEENT: PERRL, EOMI, sclera non-icteric, MMM  Respiratory: CTA b/l, good air entry b/l  Cardiovascular: RRR, normal S1S2, no M/R/G  Gastrointestinal: soft, NTND, no masses palpable, BS normal  Extremities: Warm, well perfused, pulses equal bilateral upper and lower extremities, no edema, no clubbing. Capillary refill <2 sec  Neurological: AAOx3, CN Grossly intact  Skin: Normal temperature, warm, dry    MEDICATIONS  (STANDING):  dextrose 40% Gel 15 Gram(s) Oral once  dextrose 5%. 1000 milliLiter(s) (50 mL/Hr) IV Continuous <Continuous>  dextrose 5%. 1000 milliLiter(s) (100 mL/Hr) IV Continuous <Continuous>  dextrose 50% Injectable 25 Gram(s) IV Push once  dextrose 50% Injectable 12.5 Gram(s) IV Push once  dextrose 50% Injectable 25 Gram(s) IV Push once  glucagon  Injectable 1 milliGRAM(s) IntraMuscular once  insulin glargine Injectable (LANTUS) 13 Unit(s) SubCutaneous at bedtime  insulin lispro (ADMELOG) corrective regimen sliding scale   SubCutaneous three times a day before meals    MEDICATIONS  (PRN):  acetaminophen     Tablet .. 650 milliGRAM(s) Oral every 6 hours PRN Temp greater or equal to 38C (100.4F), Mild Pain (1 - 3)  aluminum hydroxide/magnesium hydroxide/simethicone Suspension 30 milliLiter(s) Oral every 4 hours PRN Dyspepsia  melatonin 3 milliGRAM(s) Oral at bedtime PRN Insomnia  ondansetron Injectable 4 milliGRAM(s) IV Push every 8 hours PRN Nausea and/or Vomiting      Allergies    No Known Allergies    Intolerances        LABS:                        14.1   9.60  )-----------( 239      ( 2022 06:37 )             43.7         140  |  102  |  9   ----------------------------<  311<H>  4.0   |  24  |  0.79    Ca    9.2      2022 16:24    TPro  7.1  /  Alb  4.2  /  TBili  0.4  /  DBili  x   /  AST  19  /  ALT  26  /  AlkPhos  220<H>        Urinalysis Basic - ( 2022 13:06 )    Color: Light Yellow / Appearance: Slightly Turbid / S.046 / pH: x  Gluc: x / Ketone: Moderate  / Bili: Negative / Urobili: <2 mg/dL   Blood: x / Protein: Trace / Nitrite: Negative   Leuk Esterase: Negative / RBC: x / WBC 1 /HPF   Sq Epi: x / Non Sq Epi: x / Bacteria: x        RADIOLOGY & ADDITIONAL TESTS:  Reviewed
**************************************  Adelfo Morris, PGY-1  Pager: # [phone] / [phone]  Senior Resident: Kwesi Jaime  After 7PM, please contact night float at [phone] or [phone]  **************************************    INTERVAL HPI/OVERNIGHT EVENTS:  Patient was seen and examined at bedside. As per nurse and patient, no o/n events, patient resting comfortably. No complaints at this time. Feeling well. Patient ready to go home.    VITAL SIGNS:  T(F): 98.1 (22 @ 05:50)  HR: 92 (22 @ 05:50)  BP: 142/98 (22 @ 05:50)  RR: 18 (22 @ 05:50)  SpO2: 100% (22 @ 05:50)      PHYSICAL EXAM:    Constitutional: WDWN, NAD  HEENT: PERRL, EOMI, sclera non-icteric, MMM  Respiratory: CTA b/l, good air entry b/l  Cardiovascular: RRR, normal S1S2, no M/R/G  Gastrointestinal: soft, NTND, no masses palpable, BS normal  Extremities: Warm, well perfused, pulses equal bilateral upper and lower extremities, no edema, no clubbing. Capillary refill <2 sec  Neurological: AAOx3, CN Grossly intact  Skin: Normal temperature, warm, dry    MEDICATIONS  (STANDING):  dextrose 40% Gel 15 Gram(s) Oral once  dextrose 5%. 1000 milliLiter(s) (50 mL/Hr) IV Continuous <Continuous>  dextrose 5%. 1000 milliLiter(s) (100 mL/Hr) IV Continuous <Continuous>  dextrose 50% Injectable 25 Gram(s) IV Push once  dextrose 50% Injectable 12.5 Gram(s) IV Push once  dextrose 50% Injectable 25 Gram(s) IV Push once  glucagon  Injectable 1 milliGRAM(s) IntraMuscular once  insulin glargine Injectable (LANTUS) 25 Unit(s) SubCutaneous at bedtime  insulin lispro (ADMELOG) corrective regimen sliding scale   SubCutaneous three times a day before meals  insulin lispro (ADMELOG) corrective regimen sliding scale   SubCutaneous at bedtime  insulin lispro Injectable (ADMELOG) 9 Unit(s) SubCutaneous three times a day before meals  lactated ringers. 1000 milliLiter(s) (75 mL/Hr) IV Continuous <Continuous>    MEDICATIONS  (PRN):  acetaminophen     Tablet .. 650 milliGRAM(s) Oral every 6 hours PRN Temp greater or equal to 38C (100.4F), Mild Pain (1 - 3)  aluminum hydroxide/magnesium hydroxide/simethicone Suspension 30 milliLiter(s) Oral every 4 hours PRN Dyspepsia  melatonin 3 milliGRAM(s) Oral at bedtime PRN Insomnia  ondansetron Injectable 4 milliGRAM(s) IV Push every 8 hours PRN Nausea and/or Vomiting      Allergies    No Known Allergies    Intolerances        LABS:                        13.8   7.46  )-----------( 224      ( 2022 07:28 )             41.0         133<L>  |  101  |  10  ----------------------------<  319<H>  4.1   |  20<L>  |  0.76    Ca    8.7      2022 06:37  Phos  3.4       Mg     1.90         TPro  6.7  /  Alb  3.8  /  TBili  0.6  /  DBili  x   /  AST  18  /  ALT  23  /  AlkPhos  182<H>  20      Urinalysis Basic - ( 2022 13:06 )    Color: Light Yellow / Appearance: Slightly Turbid / S.046 / pH: x  Gluc: x / Ketone: Moderate  / Bili: Negative / Urobili: <2 mg/dL   Blood: x / Protein: Trace / Nitrite: Negative   Leuk Esterase: Negative / RBC: x / WBC 1 /HPF   Sq Epi: x / Non Sq Epi: x / Bacteria: x        RADIOLOGY & ADDITIONAL TESTS:  Reviewed

## 2022-01-21 NOTE — DIETITIAN INITIAL EVALUATION ADULT. - PROBLEM SELECTOR PLAN 2
Patient with A1c 10.8 i/s/o no prior DM2 diagnosis  - Treatment of DM as above  - Endo consult in AM, will need close endocrine and PMD follow-up outpatient  - Send microalbumin/Cr ratio  - f/u Lipid profile in AM  - f/u Islet cell antibody, glutamic acid, and zinc transporter

## 2022-01-26 LAB — ZINC TRANSPORTER 8 AB, RESULT: <15 U/ML — SIGNIFICANT CHANGE UP

## 2022-11-28 NOTE — PATIENT PROFILE ADULT - .
Physical Therapy Visit    Visit Type: Daily Treatment Note  Visit: 3  Referring Provider: Coni Olmos MD  Medical Diagnosis (from order): Diagnosis Information    Diagnosis  719.41, 338.29 (ICD-9-CM) - M25.511, G89.29 (ICD-10-CM) - Chronic right shoulder pain         SUBJECTIVE                                                                                                               Stated he is doing much better today, overall feeling good.     Pain / Symptoms  - Pain rating (out of 10): Current: 0       OBJECTIVE                                                                                                                                       Treatment    Ultrasound (48850)  - Location: R scapula   - Position: sitting  - Duty Cycle: 100%  - Intensity (w/cm2): 1.5  - Duration: 10 minutes    Reaction: no adverse reaction to treatment      Therapeutic Exercise  Arm bike: 6 minutes forward/backward   Wall push up: 2x15 (red band)  Scap squeezes with decompression: 2x10 (red band)  Seated ER bilaterally: 2x10 (red band)  Seated flexion: 2x10 (2#)         Manual Therapy   Seated STM to upper trap and scapula region R side    Home Exercise Program  Access Code: [email or web address]  URL: [email or web address]  Date: 11/16/2022  Prepared by: Lynette Marklein    Exercises  · Supine Chest Stretch with Elbows Bent - 1 x daily - 7 x weekly - 1 sets - 1 reps - 30 hold  · Single Arm Doorway Pec Stretch at 120 Degrees Abduction - 1 x daily - 7 x weekly - 1 sets - 1 reps - 30 hold            ASSESSMENT                                                                                                            Session consists of strengthening, US and manual to the R shoulder. He was able to tolerate more exercises with minimal pain, but did have fatigue present. External rotators are weak, he struggles with proper exercise execution due to discomfort with  ER. He requires many verbal and demonstrative cues for  proper form. Good tolerance with reported relief following US and manual. He will continue to benefit from skilled PT.   Pain/symptoms after session (out of 10): 0  Education:   - Results of above outlined education: Verbalizes understanding and Needs reinforcement       Therapy procedure time and total treatment time can be found documented on the Time Entry flowsheet     20-Jan-2022 10:22:44

## 2025-01-07 NOTE — PATIENT PROFILE ADULT - NSPRESCRALCFREQ_GEN_A_NUR
Patient calls and is requesting a refill of medication. Patient had Right TKA done 1/3/24. Please review and advise.   
2-3 times a week